# Patient Record
Sex: MALE | Race: WHITE | NOT HISPANIC OR LATINO | Employment: OTHER | URBAN - METROPOLITAN AREA
[De-identification: names, ages, dates, MRNs, and addresses within clinical notes are randomized per-mention and may not be internally consistent; named-entity substitution may affect disease eponyms.]

---

## 2021-06-21 ENCOUNTER — HOSPITAL ENCOUNTER (EMERGENCY)
Facility: HOSPITAL | Age: 21
Discharge: LEFT BEFORE DISCHARGE | End: 2021-06-21

## 2021-06-21 VITALS
SYSTOLIC BLOOD PRESSURE: 130 MMHG | TEMPERATURE: 99.3 F | RESPIRATION RATE: 18 BRPM | DIASTOLIC BLOOD PRESSURE: 88 MMHG | OXYGEN SATURATION: 98 % | HEART RATE: 100 BPM

## 2021-06-21 PROCEDURE — 99211 OFF/OP EST MAY X REQ PHY/QHP: CPT

## 2021-06-21 PROCEDURE — 99284 EMERGENCY DEPT VISIT MOD MDM: CPT

## 2021-06-21 NOTE — ED TRIAGE NOTES
Left Ankle pain - was seen recently for same.  Is able to ambulate.  It is swelling and painful to touch.  He has been on abx    Patient was placed in face mask during first look triage.  Patient was wearing a face mask throughout encounter.  I wore personal protective equipment throughout the encounter.  Hand hygiene was performed before and after patient encounter.

## 2021-06-21 NOTE — ED NOTES
Pt up to triage desk - asking about obtaining insurance, refusing to wear mask properly - explained we see people regardless of insurance or ability to pay.  Pt continues to ask about insurance - at one point got up from wheelchair. Noted that left foot is red and swollen - encouraged pt to stay, pt refused, and ambulated out of emergency room.           Phuong Henry, RN  06/21/21 1956

## 2021-06-22 ENCOUNTER — APPOINTMENT (OUTPATIENT)
Dept: GENERAL RADIOLOGY | Facility: HOSPITAL | Age: 21
End: 2021-06-22

## 2021-06-22 ENCOUNTER — HOSPITAL ENCOUNTER (OUTPATIENT)
Facility: HOSPITAL | Age: 21
Setting detail: OBSERVATION
Discharge: LEFT BEFORE DISCHARGE | End: 2021-06-23
Attending: EMERGENCY MEDICINE | Admitting: HOSPITALIST

## 2021-06-22 DIAGNOSIS — L03.116 CELLULITIS OF LEFT LOWER EXTREMITY: Primary | ICD-10-CM

## 2021-06-22 DIAGNOSIS — D72.829 LEUKOCYTOSIS, UNSPECIFIED TYPE: ICD-10-CM

## 2021-06-22 DIAGNOSIS — F19.11 HISTORY OF SUBSTANCE ABUSE (HCC): ICD-10-CM

## 2021-06-22 LAB
ALBUMIN SERPL-MCNC: 4 G/DL (ref 3.5–5.2)
ALBUMIN/GLOB SERPL: 1.2 G/DL
ALP SERPL-CCNC: 85 U/L (ref 39–117)
ALT SERPL W P-5'-P-CCNC: 12 U/L (ref 1–41)
AMPHET+METHAMPHET UR QL: POSITIVE
ANION GAP SERPL CALCULATED.3IONS-SCNC: 6.4 MMOL/L (ref 5–15)
ANION GAP SERPL CALCULATED.3IONS-SCNC: 8.5 MMOL/L (ref 5–15)
AST SERPL-CCNC: 14 U/L (ref 1–40)
BARBITURATES UR QL SCN: NEGATIVE
BASOPHILS # BLD AUTO: 0.05 10*3/MM3 (ref 0–0.2)
BASOPHILS # BLD AUTO: 0.06 10*3/MM3 (ref 0–0.2)
BASOPHILS NFR BLD AUTO: 0.4 % (ref 0–1.5)
BASOPHILS NFR BLD AUTO: 0.4 % (ref 0–1.5)
BENZODIAZ UR QL SCN: NEGATIVE
BILIRUB SERPL-MCNC: 0.7 MG/DL (ref 0–1.2)
BUN SERPL-MCNC: 11 MG/DL (ref 6–20)
BUN SERPL-MCNC: 11 MG/DL (ref 6–20)
BUN/CREAT SERPL: 12.5 (ref 7–25)
BUN/CREAT SERPL: 12.6 (ref 7–25)
CALCIUM SPEC-SCNC: 7.6 MG/DL (ref 8.6–10.5)
CALCIUM SPEC-SCNC: 8.8 MG/DL (ref 8.6–10.5)
CANNABINOIDS SERPL QL: NEGATIVE
CHLORIDE SERPL-SCNC: 102 MMOL/L (ref 98–107)
CHLORIDE SERPL-SCNC: 107 MMOL/L (ref 98–107)
CO2 SERPL-SCNC: 23.6 MMOL/L (ref 22–29)
CO2 SERPL-SCNC: 25.5 MMOL/L (ref 22–29)
COCAINE UR QL: NEGATIVE
CREAT SERPL-MCNC: 0.87 MG/DL (ref 0.76–1.27)
CREAT SERPL-MCNC: 0.88 MG/DL (ref 0.76–1.27)
CRP SERPL-MCNC: 12.82 MG/DL (ref 0–0.5)
D-LACTATE SERPL-SCNC: 0.7 MMOL/L (ref 0.5–2)
DEPRECATED RDW RBC AUTO: 35.9 FL (ref 37–54)
DEPRECATED RDW RBC AUTO: 38.4 FL (ref 37–54)
EOSINOPHIL # BLD AUTO: 0.06 10*3/MM3 (ref 0–0.4)
EOSINOPHIL # BLD AUTO: 0.1 10*3/MM3 (ref 0–0.4)
EOSINOPHIL NFR BLD AUTO: 0.4 % (ref 0.3–6.2)
EOSINOPHIL NFR BLD AUTO: 0.9 % (ref 0.3–6.2)
ERYTHROCYTE [DISTWIDTH] IN BLOOD BY AUTOMATED COUNT: 11.8 % (ref 12.3–15.4)
ERYTHROCYTE [DISTWIDTH] IN BLOOD BY AUTOMATED COUNT: 12.3 % (ref 12.3–15.4)
ERYTHROCYTE [SEDIMENTATION RATE] IN BLOOD: 33 MM/HR (ref 0–15)
ETHANOL BLD-MCNC: <10 MG/DL (ref 0–10)
ETHANOL UR QL: <0.01 %
GFR SERPL CREATININE-BSD FRML MDRD: 109 ML/MIN/1.73
GFR SERPL CREATININE-BSD FRML MDRD: 111 ML/MIN/1.73
GLOBULIN UR ELPH-MCNC: 3.3 GM/DL
GLUCOSE SERPL-MCNC: 84 MG/DL (ref 65–99)
GLUCOSE SERPL-MCNC: 95 MG/DL (ref 65–99)
HCT VFR BLD AUTO: 35.5 % (ref 37.5–51)
HCT VFR BLD AUTO: 38.9 % (ref 37.5–51)
HGB BLD-MCNC: 12.1 G/DL (ref 13–17.7)
HGB BLD-MCNC: 13.7 G/DL (ref 13–17.7)
IMM GRANULOCYTES # BLD AUTO: 0.04 10*3/MM3 (ref 0–0.05)
IMM GRANULOCYTES # BLD AUTO: 0.09 10*3/MM3 (ref 0–0.05)
IMM GRANULOCYTES NFR BLD AUTO: 0.4 % (ref 0–0.5)
IMM GRANULOCYTES NFR BLD AUTO: 0.6 % (ref 0–0.5)
INR PPP: 1.37 (ref 0.9–1.1)
LYMPHOCYTES # BLD AUTO: 3.33 10*3/MM3 (ref 0.7–3.1)
LYMPHOCYTES # BLD AUTO: 3.35 10*3/MM3 (ref 0.7–3.1)
LYMPHOCYTES NFR BLD AUTO: 21.2 % (ref 19.6–45.3)
LYMPHOCYTES NFR BLD AUTO: 29.4 % (ref 19.6–45.3)
MCH RBC QN AUTO: 29.2 PG (ref 26.6–33)
MCH RBC QN AUTO: 29.5 PG (ref 26.6–33)
MCHC RBC AUTO-ENTMCNC: 34.1 G/DL (ref 31.5–35.7)
MCHC RBC AUTO-ENTMCNC: 35.2 G/DL (ref 31.5–35.7)
MCV RBC AUTO: 83.8 FL (ref 79–97)
MCV RBC AUTO: 85.5 FL (ref 79–97)
METHADONE UR QL SCN: NEGATIVE
MONOCYTES # BLD AUTO: 1.33 10*3/MM3 (ref 0.1–0.9)
MONOCYTES # BLD AUTO: 1.62 10*3/MM3 (ref 0.1–0.9)
MONOCYTES NFR BLD AUTO: 10.3 % (ref 5–12)
MONOCYTES NFR BLD AUTO: 11.7 % (ref 5–12)
MRSA DNA SPEC QL NAA+PROBE: NORMAL
NEUTROPHILS NFR BLD AUTO: 10.52 10*3/MM3 (ref 1.7–7)
NEUTROPHILS NFR BLD AUTO: 57.2 % (ref 42.7–76)
NEUTROPHILS NFR BLD AUTO: 6.51 10*3/MM3 (ref 1.7–7)
NEUTROPHILS NFR BLD AUTO: 67.1 % (ref 42.7–76)
NRBC BLD AUTO-RTO: 0 /100 WBC (ref 0–0.2)
NRBC BLD AUTO-RTO: 0 /100 WBC (ref 0–0.2)
OPIATES UR QL: NEGATIVE
OXYCODONE UR QL SCN: NEGATIVE
PLATELET # BLD AUTO: 224 10*3/MM3 (ref 140–450)
PLATELET # BLD AUTO: 277 10*3/MM3 (ref 140–450)
PMV BLD AUTO: 9.2 FL (ref 6–12)
PMV BLD AUTO: 9.5 FL (ref 6–12)
POTASSIUM SERPL-SCNC: 3.7 MMOL/L (ref 3.5–5.2)
POTASSIUM SERPL-SCNC: 3.7 MMOL/L (ref 3.5–5.2)
PROCALCITONIN SERPL-MCNC: 0.11 NG/ML (ref 0–0.25)
PROT SERPL-MCNC: 7.3 G/DL (ref 6–8.5)
PROTHROMBIN TIME: 16.7 SECONDS (ref 11.7–14.2)
RBC # BLD AUTO: 4.15 10*6/MM3 (ref 4.14–5.8)
RBC # BLD AUTO: 4.64 10*6/MM3 (ref 4.14–5.8)
SARS-COV-2 ORF1AB RESP QL NAA+PROBE: NOT DETECTED
SODIUM SERPL-SCNC: 136 MMOL/L (ref 136–145)
SODIUM SERPL-SCNC: 137 MMOL/L (ref 136–145)
WBC # BLD AUTO: 11.38 10*3/MM3 (ref 3.4–10.8)
WBC # BLD AUTO: 15.68 10*3/MM3 (ref 3.4–10.8)

## 2021-06-22 PROCEDURE — 80307 DRUG TEST PRSMV CHEM ANLYZR: CPT | Performed by: PHYSICIAN ASSISTANT

## 2021-06-22 PROCEDURE — 82077 ASSAY SPEC XCP UR&BREATH IA: CPT | Performed by: PHYSICIAN ASSISTANT

## 2021-06-22 PROCEDURE — 87040 BLOOD CULTURE FOR BACTERIA: CPT | Performed by: PHYSICIAN ASSISTANT

## 2021-06-22 PROCEDURE — 85652 RBC SED RATE AUTOMATED: CPT | Performed by: PHYSICIAN ASSISTANT

## 2021-06-22 PROCEDURE — 73610 X-RAY EXAM OF ANKLE: CPT

## 2021-06-22 PROCEDURE — 25010000002 VANCOMYCIN 10 G RECONSTITUTED SOLUTION: Performed by: PHYSICIAN ASSISTANT

## 2021-06-22 PROCEDURE — 96365 THER/PROPH/DIAG IV INF INIT: CPT

## 2021-06-22 PROCEDURE — G0378 HOSPITAL OBSERVATION PER HR: HCPCS

## 2021-06-22 PROCEDURE — 83605 ASSAY OF LACTIC ACID: CPT | Performed by: PHYSICIAN ASSISTANT

## 2021-06-22 PROCEDURE — 25010000002 PIPERACILLIN SOD-TAZOBACTAM PER 1 G: Performed by: HOSPITALIST

## 2021-06-22 PROCEDURE — 96366 THER/PROPH/DIAG IV INF ADDON: CPT

## 2021-06-22 PROCEDURE — 25010000002 PIPERACILLIN SOD-TAZOBACTAM PER 1 G: Performed by: PHYSICIAN ASSISTANT

## 2021-06-22 PROCEDURE — 96367 TX/PROPH/DG ADDL SEQ IV INF: CPT

## 2021-06-22 PROCEDURE — 80048 BASIC METABOLIC PNL TOTAL CA: CPT | Performed by: NURSE PRACTITIONER

## 2021-06-22 PROCEDURE — 84145 PROCALCITONIN (PCT): CPT | Performed by: PHYSICIAN ASSISTANT

## 2021-06-22 PROCEDURE — 80053 COMPREHEN METABOLIC PANEL: CPT | Performed by: PHYSICIAN ASSISTANT

## 2021-06-22 PROCEDURE — 36415 COLL VENOUS BLD VENIPUNCTURE: CPT

## 2021-06-22 PROCEDURE — 96361 HYDRATE IV INFUSION ADD-ON: CPT

## 2021-06-22 PROCEDURE — 85610 PROTHROMBIN TIME: CPT | Performed by: NURSE PRACTITIONER

## 2021-06-22 PROCEDURE — 96375 TX/PRO/DX INJ NEW DRUG ADDON: CPT

## 2021-06-22 PROCEDURE — 86140 C-REACTIVE PROTEIN: CPT | Performed by: PHYSICIAN ASSISTANT

## 2021-06-22 PROCEDURE — 85025 COMPLETE CBC W/AUTO DIFF WBC: CPT | Performed by: PHYSICIAN ASSISTANT

## 2021-06-22 PROCEDURE — U0004 COV-19 TEST NON-CDC HGH THRU: HCPCS | Performed by: PHYSICIAN ASSISTANT

## 2021-06-22 PROCEDURE — 85025 COMPLETE CBC W/AUTO DIFF WBC: CPT | Performed by: NURSE PRACTITIONER

## 2021-06-22 PROCEDURE — 25010000002 VANCOMYCIN PER 500 MG: Performed by: HOSPITALIST

## 2021-06-22 PROCEDURE — 87641 MR-STAPH DNA AMP PROBE: CPT | Performed by: HOSPITALIST

## 2021-06-22 PROCEDURE — 25010000002 KETOROLAC TROMETHAMINE PER 15 MG: Performed by: PHYSICIAN ASSISTANT

## 2021-06-22 RX ORDER — SODIUM CHLORIDE 0.9 % (FLUSH) 0.9 %
10 SYRINGE (ML) INJECTION AS NEEDED
Status: DISCONTINUED | OUTPATIENT
Start: 2021-06-22 | End: 2021-06-23 | Stop reason: HOSPADM

## 2021-06-22 RX ORDER — SODIUM CHLORIDE 9 MG/ML
1000 INJECTION, SOLUTION INTRAVENOUS CONTINUOUS
Status: DISCONTINUED | OUTPATIENT
Start: 2021-06-22 | End: 2021-06-22

## 2021-06-22 RX ORDER — ACETAMINOPHEN 160 MG/5ML
650 SOLUTION ORAL EVERY 4 HOURS PRN
Status: DISCONTINUED | OUTPATIENT
Start: 2021-06-22 | End: 2021-06-23 | Stop reason: HOSPADM

## 2021-06-22 RX ORDER — HYDROCODONE BITARTRATE AND ACETAMINOPHEN 5; 325 MG/1; MG/1
1 TABLET ORAL EVERY 6 HOURS PRN
Status: DISCONTINUED | OUTPATIENT
Start: 2021-06-22 | End: 2021-06-23 | Stop reason: HOSPADM

## 2021-06-22 RX ORDER — ONDANSETRON 2 MG/ML
4 INJECTION INTRAMUSCULAR; INTRAVENOUS EVERY 6 HOURS PRN
Status: DISCONTINUED | OUTPATIENT
Start: 2021-06-22 | End: 2021-06-23 | Stop reason: HOSPADM

## 2021-06-22 RX ORDER — KETOROLAC TROMETHAMINE 30 MG/ML
30 INJECTION, SOLUTION INTRAMUSCULAR; INTRAVENOUS EVERY 6 HOURS PRN
Status: DISCONTINUED | OUTPATIENT
Start: 2021-06-22 | End: 2021-06-23 | Stop reason: HOSPADM

## 2021-06-22 RX ORDER — SODIUM CHLORIDE 9 MG/ML
100 INJECTION, SOLUTION INTRAVENOUS CONTINUOUS
Status: DISCONTINUED | OUTPATIENT
Start: 2021-06-22 | End: 2021-06-23 | Stop reason: HOSPADM

## 2021-06-22 RX ORDER — ACETAMINOPHEN 325 MG/1
650 TABLET ORAL EVERY 4 HOURS PRN
Status: DISCONTINUED | OUTPATIENT
Start: 2021-06-22 | End: 2021-06-23 | Stop reason: HOSPADM

## 2021-06-22 RX ORDER — KETOROLAC TROMETHAMINE 30 MG/ML
30 INJECTION, SOLUTION INTRAMUSCULAR; INTRAVENOUS ONCE
Status: COMPLETED | OUTPATIENT
Start: 2021-06-22 | End: 2021-06-22

## 2021-06-22 RX ORDER — VANCOMYCIN HYDROCHLORIDE 1 G/200ML
15 INJECTION, SOLUTION INTRAVENOUS EVERY 8 HOURS
Status: DISCONTINUED | OUTPATIENT
Start: 2021-06-22 | End: 2021-06-23 | Stop reason: HOSPADM

## 2021-06-22 RX ORDER — SODIUM CHLORIDE 0.9 % (FLUSH) 0.9 %
10 SYRINGE (ML) INJECTION EVERY 12 HOURS SCHEDULED
Status: DISCONTINUED | OUTPATIENT
Start: 2021-06-22 | End: 2021-06-23 | Stop reason: HOSPADM

## 2021-06-22 RX ORDER — ACETAMINOPHEN 650 MG/1
650 SUPPOSITORY RECTAL EVERY 4 HOURS PRN
Status: DISCONTINUED | OUTPATIENT
Start: 2021-06-22 | End: 2021-06-23 | Stop reason: HOSPADM

## 2021-06-22 RX ADMIN — SODIUM CHLORIDE, PRESERVATIVE FREE 10 ML: 5 INJECTION INTRAVENOUS at 11:50

## 2021-06-22 RX ADMIN — SODIUM CHLORIDE 1000 ML: 9 INJECTION, SOLUTION INTRAVENOUS at 04:45

## 2021-06-22 RX ADMIN — KETOROLAC TROMETHAMINE 30 MG: 30 INJECTION, SOLUTION INTRAMUSCULAR at 03:23

## 2021-06-22 RX ADMIN — SODIUM CHLORIDE 100 ML/HR: 9 INJECTION, SOLUTION INTRAVENOUS at 17:34

## 2021-06-22 RX ADMIN — TAZOBACTAM SODIUM AND PIPERACILLIN SODIUM 3.38 G: 375; 3 INJECTION, SOLUTION INTRAVENOUS at 03:23

## 2021-06-22 RX ADMIN — SODIUM CHLORIDE 1000 ML: 9 INJECTION, SOLUTION INTRAVENOUS at 03:22

## 2021-06-22 RX ADMIN — TAZOBACTAM SODIUM AND PIPERACILLIN SODIUM 3.38 G: 375; 3 INJECTION, SOLUTION INTRAVENOUS at 18:35

## 2021-06-22 RX ADMIN — VANCOMYCIN HYDROCHLORIDE 1500 MG: 10 INJECTION, POWDER, LYOPHILIZED, FOR SOLUTION INTRAVENOUS at 03:42

## 2021-06-22 RX ADMIN — VANCOMYCIN HYDROCHLORIDE 1000 MG: 1 INJECTION, SOLUTION INTRAVENOUS at 18:41

## 2021-06-22 NOTE — H&P
HISTORY AND PHYSICAL   [facility]        Patient Identification:  Name: Get Coronado  Age: 21 y.o.  Sex: male  :  2000  MRN: [id number]                     Primary Care Physician: Provider, No Known    Chief Complaint:  Left foot and leg pain    History of Present Illness:        The patient  is a 21 y.o. male who presents to the hospital complaining of left lower extremity pain and swelling that has been ongoing for the past 3 to 4 days.  Patient states it started at the back of the ankle and now has spread to encompass the foot, ankle, and extends of the lower third of the leg.  He states it is red and warm to the touch.  He denies fever but endorses chills.  He denies recent injury.     Reviewing the patient's chart he apparently has a past medical history of substance abuse, asthma.        The patient was evaluated in the ER and appeared to have significant cellulitis in the left lower extremity.  He was started on broad-spectrum IV antibiotics and admitted for further evaluation treatment of this.    Past Medical History:  History reviewed. No pertinent past medical history.  Past Surgical History:  History reviewed. No pertinent surgical history.   Home Meds:  No medications prior to admission.     Current meds    Current Facility-Administered Medications:   •  acetaminophen (TYLENOL) tablet 650 mg, 650 mg, Oral, Q4H PRN **OR** acetaminophen (TYLENOL) 160 MG/5ML solution 650 mg, 650 mg, Oral, Q4H PRN **OR** acetaminophen (TYLENOL) suppository 650 mg, 650 mg, Rectal, Q4H PRN, Gris Noriega, APRN  •  ketorolac (TORADOL) injection 30 mg, 30 mg, Intravenous, Q6H PRN, Gris Noriega APRN  •  ondansetron (ZOFRAN) injection 4 mg, 4 mg, Intravenous, Q6H PRN, Gris Noriega APRN  •  [COMPLETED] Insert peripheral IV, , , Once **AND** sodium chloride 0.9 % flush 10 mL, 10 mL, Intravenous, PRN, Kory Brown III, PA  •  sodium chloride 0.9 % flush 10 mL, 10 mL,  Intravenous, Q12H, Gris Noriega APRN, 10 mL at 21 1150  •  sodium chloride 0.9 % flush 10 mL, 10 mL, Intravenous, PRN, Gris Noriega APRN  Allergies:  No Known Allergies  Immunizations:    There is no immunization history on file for this patient.  Social History:   Social History     Social History Narrative   • Not on file     Social History     Socioeconomic History   • Marital status: Single     Spouse name: Not on file   • Number of children: Not on file   • Years of education: Not on file   • Highest education level: Not on file   Tobacco Use   • Smoking status: Current Every Day Smoker   • Smokeless tobacco: Never Used   Substance and Sexual Activity   • Alcohol use: Not Currently   • Drug use: Never   • Sexual activity: Defer       Family History:  History reviewed. No pertinent family history.     Review of Systems  See history of present illness and past medical history.  Patient denies headache, dizziness, syncope, falls, trauma, change in vision, change in hearing, change in taste, changes in weight, changes in appetite, focal weakness, numbness, or paresthesia.  Patient denies chest pain, palpitations, dyspnea, orthopnea, PND, cough, sinus pressure, rhinorrhea, epistaxis, hemoptysis, nausea, vomiting, hematemesis, diarrhea, constipation or hematchezia.  Denies cold or heat intolerance, polydipsia, polyuria, polyphagia. Denies hematuria, pyuria, dysuria, hesitancy, frequency or urgency.   Denies fever, chills, sweats, night sweats.  Denies missing any routine medications. Remainder of ROS is negative.    Objective:  tMax 24 hrs: Temp (24hrs), Av.6 °F (36.4 °C), Min:96.4 °F (35.8 °C), Max:98.7 °F (37.1 °C)    Vitals Ranges:   Temp:  [96.4 °F (35.8 °C)-98.7 °F (37.1 °C)] 96.6 °F (35.9 °C)  Heart Rate:  [] 52  Resp:  [14-18] 16  BP: ()/(49-72) 105/72      Exam:  /72 (BP Location: Left arm, Patient Position: Lying)   Pulse 52   Temp 96.6 °F (35.9 °C) (Oral)    "Resp 16   Ht 190.5 cm (75\")   Wt 74.8 kg (165 lb)   SpO2 100%   BMI 20.62 kg/m²     General Appearance:    Alert, cooperative, no distress, appears stated age   Head:    Normocephalic, without obvious abnormality, atraumatic   Eyes:    PERRL, conjunctivae/corneas clear, EOM's intact, both eyes   Ears:    Normal external ear canals, both ears   Nose:   Nares normal, septum midline, mucosa normal, no drainage    or sinus tenderness   Throat:   Lips, mucosa, and tongue normal   Neck:   Supple, symmetrical, trachea midline, no adenopathy;     thyroid:  no enlargement/tenderness/nodules; no carotid    bruit or JVD   Back:     Symmetric, no curvature, ROM normal, no CVA tenderness   Lungs:     Clear to auscultation bilaterally, respirations unlabored   Chest Wall:    No tenderness or deformity    Heart:    Regular rate and rhythm, S1 and S2 normal, no murmur, rub   or gallop   Abdomen:     Soft, nontender, bowel sounds active all four quadrants,     no masses, no hepatomegaly, no splenomegaly   Extremities:   Extremities normal, atraumatic, no cyanosis or edema   Pulses:   2+ and symmetric all extremities   Skin:   Skin color, texture, turgor normal, cellulitis left leg and foot   Lymph nodes:   Cervical, supraclavicular, and axillary nodes normal   Neurologic:   CNII-XII intact, normal strength, sensation intact throughout      .    Data Review:  Lab Results (last 72 hours)     Procedure Component Value Units Date/Time    Urine Drug Screen - Urine, Clean Catch [[id number]]  (Abnormal) Collected: 06/22/21 1159    Specimen: Urine, Clean Catch Updated: 06/22/21 1300     Amphet/Methamphet, Screen Positive     Barbiturates Screen, Urine Negative     Benzodiazepine Screen, Urine Negative     Cocaine Screen, Urine Negative     Opiate Screen Negative     THC, Screen, Urine Negative     Methadone Screen, Urine Negative     Oxycodone Screen, Urine Negative    Narrative:      Negative Thresholds Per Drugs " Screened:    Amphetamines                 500 ng/ml  Barbiturates                 200 ng/ml  Benzodiazepines              100 ng/ml  Cocaine                      300 ng/ml  Methadone                    300 ng/ml  Opiates                      300 ng/ml  Oxycodone                    100 ng/ml  THC                           50 ng/ml    The Normal Value for all drugs tested is negative. This report includes final unconfirmed screening results to be used for medical treatment purposes only. Unconfirmed results must not be used for non-medical purposes such as employment or legal testing. Clinical consideration should be applied to any drug of abuse test, particularly when unconfirmed results are used.            COVID PRE-OP / PRE-PROCEDURE SCREENING ORDER (NO ISOLATION) - Swab, Nasopharynx [[id number]]  (Normal) Collected: 06/22/21 0230    Specimen: Swab from Nasopharynx Updated: 06/22/21 1140    Narrative:      The following orders were created for panel order COVID PRE-OP / PRE-PROCEDURE SCREENING ORDER (NO ISOLATION) - Swab, Nasopharynx.  Procedure                               Abnormality         Status                     ---------                               -----------         ------                     COVID-19,APTIMA PANTHER,...[[id number]]  Normal              Final result                 Please view results for these tests on the individual orders.    COVID-19,APTIMA PANTHER,[facility], NP/OP SWAB IN UTM/VTM/SALINE TRANSPORT MEDIA,24 HR TAT - Swab, Nasopharynx [[id number]]  (Normal) Collected: 06/22/21 0230    Specimen: Swab from Nasopharynx Updated: 06/22/21 1140     COVID19 Not Detected    Narrative:      Fact sheet for providers: https://www.fda.gov/media/928688/download     Fact sheet for patients: https://www.fda.gov/media/486046/download    Test performed by RT PCR.    Protime-INR [[id number]]  (Abnormal) Collected: 06/22/21 0551    Specimen: Blood Updated: 06/22/21 0626     Protime 16.7 Seconds       INR 1.37    Basic Metabolic Panel [[id number]]  (Abnormal) Collected: 06/22/21 0551    Specimen: Blood Updated: 06/22/21 0623     Glucose 84 mg/dL      BUN 11 mg/dL      Creatinine 0.88 mg/dL      Sodium 137 mmol/L      Potassium 3.7 mmol/L      Chloride 107 mmol/L      CO2 23.6 mmol/L      Calcium 7.6 mg/dL      eGFR Non African Amer 109 mL/min/1.73      BUN/Creatinine Ratio 12.5     Anion Gap 6.4 mmol/L     Narrative:      GFR Normal >60  Chronic Kidney Disease <60  Kidney Failure <15      CBC Auto Differential [[id number]]  (Abnormal) Collected: 06/22/21 0551    Specimen: Blood Updated: 06/22/21 0607     WBC 11.38 10*3/mm3      RBC 4.15 10*6/mm3      Hemoglobin 12.1 g/dL      Hematocrit 35.5 %      MCV 85.5 fL      MCH 29.2 pg      MCHC 34.1 g/dL      RDW 12.3 %      RDW-SD 38.4 fl      MPV 9.5 fL      Platelets 224 10*3/mm3      Neutrophil % 57.2 %      Lymphocyte % 29.4 %      Monocyte % 11.7 %      Eosinophil % 0.9 %      Basophil % 0.4 %      Immature Grans % 0.4 %      Neutrophils, Absolute 6.51 10*3/mm3      Lymphocytes, Absolute 3.35 10*3/mm3      Monocytes, Absolute 1.33 10*3/mm3      Eosinophils, Absolute 0.10 10*3/mm3      Basophils, Absolute 0.05 10*3/mm3      Immature Grans, Absolute 0.04 10*3/mm3      nRBC 0.0 /100 WBC     Ethanol [[id number]] Collected: 06/22/21 0359    Specimen: Blood Updated: 06/22/21 0436     Ethanol <10 mg/dL      Ethanol % <0.010 %     Sedimentation Rate [[id number]]  (Abnormal) Collected: 06/22/21 0225    Specimen: Blood Updated: 06/22/21 0308     Sed Rate 33 mm/hr     Procalcitonin [[id number]]  (Normal) Collected: 06/22/21 0225    Specimen: Blood Updated: 06/22/21 0303     Procalcitonin 0.11 ng/mL     Narrative:      As a Marker for Sepsis (Non-Neonates):     1. <0.5 ng/mL represents a low risk of severe sepsis and/or septic shock.  2. >2 ng/mL represents a high risk of severe sepsis and/or septic shock.    As a Marker for Lower Respiratory Tract Infections that require  "antibiotic therapy:  PCT on Admission     Antibiotic Therapy             6-12 Hrs later  >0.5                          Strongly Recommended            >0.25 - <0.5             Recommended  0.1 - 0.25                  Discouraged                       Remeasure/reassess PCT  <0.1                         Strongly Discouraged         Remeasure/reassess PCT      As 28 day mortality risk marker: \"Change in Procalcitonin Result\" (>80% or <=80%) if Day 0 (or Day 1) and Day 4 values are available. Refer to http://www.[facility]cal[facility]/    Change in PCT <=80 %   A decrease of PCT levels below or equal to 80% defines a positive change in PCT test result representing a higher risk for 28-day all-cause mortality of patients diagnosed with severe sepsis or septic shock.    Change in PCT >80 %   A decrease of PCT levels of more than 80% defines a negative change in PCT result representing a lower risk for 28-day all-cause mortality of patients diagnosed with severe sepsis or septic shock.              Results may be falsely decreased if patient taking Biotin.     Comprehensive Metabolic Panel [[id number]] Collected: 06/22/21 0225    Specimen: Blood Updated: 06/22/21 0257     Glucose 95 mg/dL      BUN 11 mg/dL      Creatinine 0.87 mg/dL      Sodium 136 mmol/L      Potassium 3.7 mmol/L      Chloride 102 mmol/L      CO2 25.5 mmol/L      Calcium 8.8 mg/dL      Total Protein 7.3 g/dL      Albumin 4.00 g/dL      ALT (SGPT) 12 U/L      AST (SGOT) 14 U/L      Alkaline Phosphatase 85 U/L      Total Bilirubin 0.7 mg/dL      eGFR Non African Amer 111 mL/min/1.73      Globulin 3.3 gm/dL      A/G Ratio 1.2 g/dL      BUN/Creatinine Ratio 12.6     Anion Gap 8.5 mmol/L     Narrative:      GFR Normal >60  Chronic Kidney Disease <60  Kidney Failure <15      C-reactive Protein [[id number]]  (Abnormal) Collected: 06/22/21 0225    Specimen: Blood Updated: 06/22/21 0257     C-Reactive Protein 12.82 mg/dL     Lactic Acid, Plasma [[id number]]  " (Normal) Collected: 06/22/21 0225    Specimen: Blood Updated: 06/22/21 0254     Lactate 0.7 mmol/L     Blood Culture - Blood, Arm, Right [[id number]] Collected: 06/22/21 0226    Specimen: Blood from Arm, Right Updated: 06/22/21 0241    Blood Culture - Blood, Arm, Right [[id number]] Collected: 06/22/21 0225    Specimen: Blood from Arm, Right Updated: 06/22/21 0240    CBC & Differential [[id number]]  (Abnormal) Collected: 06/22/21 0225    Specimen: Blood Updated: 06/22/21 0240    Narrative:      The following orders were created for panel order CBC & Differential.  Procedure                               Abnormality         Status                     ---------                               -----------         ------                     CBC Auto Differential[[id number]]        Abnormal            Final result                 Please view results for these tests on the individual orders.    CBC Auto Differential [[id number]]  (Abnormal) Collected: 06/22/21 0225    Specimen: Blood Updated: 06/22/21 0240     WBC 15.68 10*3/mm3      RBC 4.64 10*6/mm3      Hemoglobin 13.7 g/dL      Hematocrit 38.9 %      MCV 83.8 fL      MCH 29.5 pg      MCHC 35.2 g/dL      RDW 11.8 %      RDW-SD 35.9 fl      MPV 9.2 fL      Platelets 277 10*3/mm3      Neutrophil % 67.1 %      Lymphocyte % 21.2 %      Monocyte % 10.3 %      Eosinophil % 0.4 %      Basophil % 0.4 %      Immature Grans % 0.6 %      Neutrophils, Absolute 10.52 10*3/mm3      Lymphocytes, Absolute 3.33 10*3/mm3      Monocytes, Absolute 1.62 10*3/mm3      Eosinophils, Absolute 0.06 10*3/mm3      Basophils, Absolute 0.06 10*3/mm3      Immature Grans, Absolute 0.09 10*3/mm3      nRBC 0.0 /100 WBC                    Imaging Results (All)     Procedure Component Value Units Date/Time    XR Ankle 3+ View Left [[id number]] Collected: 06/22/21 0352     Updated: 06/22/21 0352    Narrative:        Patient: ALESSANDRO HARO  Time Out: 03:51  Exam(s): FILM LEFT ANKLE     EXAM:    XR Left Ankle  Complete, 3 or More Views    CLINICAL HISTORY:     Reason for exam: ankle pain.    TECHNIQUE:    Frontal, lateral and oblique views of the left ankle.    COMPARISON:    No relevant prior studies available.    FINDINGS:    Lateral soft tissue swelling.  No acute fracture or malalignment.  No   focal osseous abnormality.  Ankle mortise is congruent.    IMPRESSION:       Lateral soft tissue swelling.  No acute fracture.      Impression:          Electronically signed by Alfred Sawyer M.D. on 06-22-21 at 0351        History reviewed. No pertinent past medical history.    Assessment:  Active Hospital Problems    Diagnosis  POA   • **Cellulitis of left lower extremity [L03.116]  Yes   • History of substance abuse (CMS/[facility]) [F19.11]  Unknown   • Leukocytosis [D72.829]  Unknown      Resolved Hospital Problems   No resolved problems to display.       Plan:      The patient is admitted to the hospital and will continue with broad-spectrum IV antibiotics.  Await results of cultures.  Will get follow-up lab studies.    Alon Montez MD  6/22/2021  16:16 EDT

## 2021-06-22 NOTE — ED NOTES
Nursing report ED to floor  [facility]  21 y.o.  male    HPI (triage note):   Chief Complaint   Patient presents with   • Leg Swelling       Admitting doctor:   Alon Montez MD    Admitting diagnosis:   The primary encounter diagnosis was Cellulitis of left lower extremity. Diagnoses of Leukocytosis, unspecified type and History of substance abuse (CMS/HCC) were also pertinent to this visit.    Code status:   Current Code Status     Date Active Code Status Order ID Comments User Context       6/22/2021 0434 CPR [id number]  Gris Noriega, [facility] ED     Advance Care Planning Activity      Questions for Current Code Status     Question Answer Comment    Code Status CPR     Medical Interventions (Level of Support Prior to Arrest) Full           Allergies:   Patient has no known allergies.    Weight:       06/21/21  2345   Weight: 74.8 kg (165 lb)       Most recent vitals:   Vitals:    06/22/21 0430 06/22/21 0500 06/22/21 0530 06/22/21 0600   BP: 107/61 96/63 100/55 102/70   Pulse: 59 65  65   Resp:  18     Temp:       TempSrc:       SpO2: 96% 99% 100% 98%   Weight:       Height:           Active LDAs/IV Access:   Lines, Drains & Airways    Active LDAs     Name:   Placement date:   Placement time:   Site:   Days:    Peripheral IV 06/22/21 0220 Right Antecubital   06/22/21 0220    Antecubital   less than 1                Labs (abnormal labs have a star):   Labs Reviewed   SEDIMENTATION RATE - Abnormal; Notable for the following components:       Result Value    Sed Rate 33 (*)     All other components within normal limits   C-REACTIVE PROTEIN - Abnormal; Notable for the following components:    C-Reactive Protein 12.82 (*)     All other components within normal limits   CBC WITH AUTO DIFFERENTIAL - Abnormal; Notable for the following components:    WBC 15.68 (*)     RDW 11.8 (*)     RDW-SD 35.9 (*)     Immature Grans % 0.6 (*)     Neutrophils, Absolute 10.52 (*)     Lymphocytes, Absolute 3.33 (*)      "Monocytes, Absolute 1.62 (*)     Immature Grans, Absolute 0.09 (*)     All other components within normal limits   BASIC METABOLIC PANEL - Abnormal; Notable for the following components:    Calcium 7.6 (*)     All other components within normal limits    Narrative:     GFR Normal >60  Chronic Kidney Disease <60  Kidney Failure <15     CBC WITH AUTO DIFFERENTIAL - Abnormal; Notable for the following components:    WBC 11.38 (*)     Hemoglobin 12.1 (*)     Hematocrit 35.5 (*)     Lymphocytes, Absolute 3.35 (*)     Monocytes, Absolute 1.33 (*)     All other components within normal limits   PROTIME-INR - Abnormal; Notable for the following components:    Protime 16.7 (*)     INR 1.37 (*)     All other components within normal limits   LACTIC ACID, PLASMA - Normal   PROCALCITONIN - Normal    Narrative:     As a Marker for Sepsis (Non-Neonates):     1. <0.5 ng/mL represents a low risk of severe sepsis and/or septic shock.  2. >2 ng/mL represents a high risk of severe sepsis and/or septic shock.    As a Marker for Lower Respiratory Tract Infections that require antibiotic therapy:  PCT on Admission     Antibiotic Therapy             6-12 Hrs later  >0.5                          Strongly Recommended            >0.25 - <0.5             Recommended  0.1 - 0.25                  Discouraged                       Remeasure/reassess PCT  <0.1                         Strongly Discouraged         Remeasure/reassess PCT      As 28 day mortality risk marker: \"Change in Procalcitonin Result\" (>80% or <=80%) if Day 0 (or Day 1) and Day 4 values are available. Refer to http://www.[facility]-pct-calculator.com/    Change in PCT <=80 %   A decrease of PCT levels below or equal to 80% defines a positive change in PCT test result representing a higher risk for 28-day all-cause mortality of patients diagnosed with severe sepsis or septic shock.    Change in PCT >80 %   A decrease of PCT levels of more than 80% defines a negative change in PCT " result representing a lower risk for 28-day all-cause mortality of patients diagnosed with severe sepsis or septic shock.              Results may be falsely decreased if patient taking Biotin.    BLOOD CULTURE   BLOOD CULTURE   COVID PRE-OP / PRE-PROCEDURE SCREENING ORDER (NO ISOLATION)    Narrative:     The following orders were created for panel order COVID PRE-OP / PRE-PROCEDURE SCREENING ORDER (NO ISOLATION) - Swab, Nasopharynx.  Procedure                               Abnormality         Status                     ---------                               -----------         ------                     COVID-19,APTIMA PANTHER,...[[id number]]                      In process                   Please view results for these tests on the individual orders.   COVID-19,APTIMA PANTHER,[facility],NP/OP SWAB IN UTM/VTM/SALINE TRANSPORT MEDIA,24 HR TAT   COMPREHENSIVE METABOLIC PANEL    Narrative:     GFR Normal >60  Chronic Kidney Disease <60  Kidney Failure <15     ETHANOL   URINE DRUG SCREEN   CBC AND DIFFERENTIAL    Narrative:     The following orders were created for panel order CBC & Differential.  Procedure                               Abnormality         Status                     ---------                               -----------         ------                     CBC Auto Differential[[id number]]        Abnormal            Final result                 Please view results for these tests on the individual orders.       EKG:   No orders to display       Meds given in ED:   Medications   sodium chloride 0.9 % flush 10 mL (has no administration in time range)   sodium chloride 0.9 % flush 10 mL (has no administration in time range)   sodium chloride 0.9 % flush 10 mL (has no administration in time range)   acetaminophen (TYLENOL) tablet 650 mg (has no administration in time range)     Or   acetaminophen (TYLENOL) 160 MG/5ML solution 650 mg (has no administration in time range)     Or   acetaminophen (TYLENOL)  suppository 650 mg (has no administration in time range)   ondansetron (ZOFRAN) injection 4 mg (has no administration in time range)   ketorolac (TORADOL) injection 30 mg (has no administration in time range)   vancomycin 1500 mg/500 mL 0.9% NS IVPB ([facility]) (0 mg Intravenous Stopped 6/22/21 0538)   piperacillin-tazobactam (ZOSYN) 3.375 g in iso-osmotic dextrose 50 ml (premix) (0 g Intravenous Stopped 6/22/21 0342)   ketorolac (TORADOL) injection 30 mg (30 mg Intravenous Given 6/22/21 0323)   sodium chloride 0.9 % bolus 1,000 mL (0 mL Intravenous Stopped 6/22/21 0445)   sodium chloride 0.9 % bolus 1,000 mL (0 mL Intravenous Stopped 6/22/21 0538)       Imaging results:  XR Ankle 3+ View Left    Result Date: 6/22/2021  Electronically signed by Alfred Sawyer M.D. on 06-22-21 at 0351      Ambulatory status:   - with assist      Social issues:   Social History     Socioeconomic History   • Marital status: Single     Spouse name: Not on file   • Number of children: Not on file   • Years of education: Not on file   • Highest education level: Not on file   Tobacco Use   • Smoking status: Current Every Day Smoker   • Smokeless tobacco: Never Used   Substance and Sexual Activity   • Alcohol use: Not Currently   • Drug use: Never   • Sexual activity: Defer    Nursing report ED to floor       Nancy Sheffield RN  06/22/21 3919

## 2021-06-22 NOTE — ED NOTES
Pt has refused to urinate and refuses to give a urine sample as well as refuses to allow an in and out for urine.      Nancy Sheffield RN  06/22/21 0704

## 2021-06-22 NOTE — ED TRIAGE NOTES
Pt to ER with c/o L ankle swelling. Pt recently LWBS for same. Pt L ankle appears red and swollen, pt ambulatory with difficulty in triage.      Pt masked in triage, staff in appropriate ppe.

## 2021-06-22 NOTE — ED NOTES
Resting at this time. Less confused after awakening but still very drowsy and startles when awakened. Restless when receiving care, SCDs not applied at this time due to restlessness.      Lesly Sherman, PERI  06/22/21 0516

## 2021-06-22 NOTE — CASE MANAGEMENT/SOCIAL WORK
Discharge Planning Assessment  [facility]     Patient Name: Get Coronado  MRN: [id number]  Today's Date: 6/22/2021    Admit Date: 6/22/2021    Discharge Needs Assessment     Row Name 06/22/21 9087       Living Environment    Lives With  alone    Current Living Arrangements  homeless    Primary Care Provided by  self    Provides Primary Care For  no one, unable/limited ability to care for self    Family Caregiver if Needed  none    Quality of Family Relationships  involved    Able to Return to Prior Arrangements  no       Resource/Environmental Concerns    Resource/Environmental Concerns  none    Transportation Concerns  car, none       Transition Planning    Patient/Family Anticipated Services at Transition  none    Transportation Anticipated  other (see comments) LYFT or cab       Discharge Needs Assessment    Equipment Currently Used at Home  none    Concerns to be Addressed  homelessness    Anticipated Changes Related to Illness  none    Equipment Needed After Discharge  none    Current Discharge Risk  physical impairment;homeless        Discharge Plan     Row Name 06/22/21 8580       Plan    Plan  Plans home; denies needs.    Provided Post Acute Provider List?  N/A    N/A Provider List Comment  The patient denies needs upon d/c.    Provided Post Acute Provider Quality & Resource List?  N/A    N/A Quality & Resource List Comment  The patient denies needs upon d/c.    Patient/Family in Agreement with Plan  yes    Plan Comments  Met with the patient at bedside; explained role of CCP, verified facesheet and discussed discharge planning needs.  The patient provided CCP with permission to speak to his mother Laury Coronado [phone] and to inform her that he was a patient at [facility].  The patient states that his parents had been helping cover the cost of him staying in hotels due to him not being allowed to reside in the home of his parents.  The patient states that his parents state that they will assist him in  obtaining an apartment if he is able to get a job.  The patient states that he was hired at the [employer] [facility] [place] however he was admitted into the hospital.  The patient states that he was on his parents insurance.  The patient was not sure where he would go upon d/c as he refused to go to a homeless shelter and states that he has been to [facility] previously.  The patient also refused any offers for referrals to obtain substance use disorder treatment.  Spoke to the patient’s mother who confirmed all the information the patient provided to [facility].  She states that she will email CCP a copy of her insurance card that as the patient is covered under her plan and she states that she will consider paying for another hotel for the patient upon d/c.  CCP will follow for the patient’s needs and will reach out to his mother to see if they will cover a hotel for the patient.  If so, will obtain the address and will utilize LYFT or a cab to get the patient to that location upon d/c.  If not, the patient will need to d/c to a homeless shelter or provide CCP with another address for him to go to upon d/c.  CCP will follow for patient’s d/c needs, will follow up with his mother regarding hotel location or if the patient will need to d/c to another address or homeless shelter and will assist with transportation for the patient upon d/c. ANJELICA Green        Continued Care and Services - Admitted Since 6/22/2021    Coordination has not been started for this encounter.         Demographic Summary     Row Name 06/22/21 4922       General Information    Admission Type  observation    Arrived From  home    Referral Source  admission list    Reason for Consult  discharge planning    Preferred Language  English     Used During This Interaction  no       Contact Information    Permission Granted to Share Info With  family/designee        Functional Status     Row Name 06/22/21 5177       Functional Status     Usual Activity Tolerance  moderate    Current Activity Tolerance  fair       Functional Status, IADL    Medications  independent    Meal Preparation  independent    Housekeeping  independent    Laundry  independent    Shopping  independent       Mental Status    General Appearance WDL  WDL       Mental Status Summary    Recent Changes in Mental Status/Cognitive Functioning  no changes        Psychosocial    No documentation.       Abuse/Neglect    No documentation.       Legal    No documentation.       Substance Abuse    No documentation.       Patient Forms    No documentation.           ANJELICA Dimas

## 2021-06-22 NOTE — ED PROVIDER NOTES
MD ATTESTATION NOTE    The YVETTE and I have discussed this patient's history, physical exam, and treatment plan.  I have reviewed the documentation and personally had a face to face interaction with the patient. I affirm the documentation and agree with the treatment and plan.  The attached note describes my personal findings.      Get Coronado is a 21 y.o. male who presents to the ED c/o redness and swelling to left ankle.  Reports fever and chills.  Has been present for several days.  Denies chest pain abdominal pain nausea vomiting.      On exam:  No acute distress, normocephalic atraumatic, supple nontender, regular rate and rhythm, clear to auscultation bilaterally, soft nontender nondistended, moving all extremities -erythema and swelling to lateral left ankle neurovascularly intact distally    Labs  Recent Results (from the past 24 hour(s))   Comprehensive Metabolic Panel    Collection Time: 06/22/21  2:25 AM    Specimen: Blood   Result Value Ref Range    Glucose 95 65 - 99 mg/dL    BUN 11 6 - 20 mg/dL    Creatinine 0.87 0.76 - 1.27 mg/dL    Sodium 136 136 - 145 mmol/L    Potassium 3.7 3.5 - 5.2 mmol/L    Chloride 102 98 - 107 mmol/L    CO2 25.5 22.0 - 29.0 mmol/L    Calcium 8.8 8.6 - 10.5 mg/dL    Total Protein 7.3 6.0 - 8.5 g/dL    Albumin 4.00 3.50 - 5.20 g/dL    ALT (SGPT) 12 1 - 41 U/L    AST (SGOT) 14 1 - 40 U/L    Alkaline Phosphatase 85 39 - 117 U/L    Total Bilirubin 0.7 0.0 - 1.2 mg/dL    eGFR Non African Amer 111 >60 mL/min/1.73    Globulin 3.3 gm/dL    A/G Ratio 1.2 g/dL    BUN/Creatinine Ratio 12.6 7.0 - 25.0    Anion Gap 8.5 5.0 - 15.0 mmol/L   Lactic Acid, Plasma    Collection Time: 06/22/21  2:25 AM    Specimen: Blood   Result Value Ref Range    Lactate 0.7 0.5 - 2.0 mmol/L   Procalcitonin    Collection Time: 06/22/21  2:25 AM    Specimen: Blood   Result Value Ref Range    Procalcitonin 0.11 0.00 - 0.25 ng/mL   Sedimentation Rate    Collection Time: 06/22/21  2:25 AM    Specimen: Blood    Result Value Ref Range    Sed Rate 33 (H) 0 - 15 mm/hr   C-reactive Protein    Collection Time: 06/22/21  2:25 AM    Specimen: Blood   Result Value Ref Range    C-Reactive Protein 12.82 (H) 0.00 - 0.50 mg/dL   CBC Auto Differential    Collection Time: 06/22/21  2:25 AM    Specimen: Blood   Result Value Ref Range    WBC 15.68 (H) 3.40 - 10.80 10*3/mm3    RBC 4.64 4.14 - 5.80 10*6/mm3    Hemoglobin 13.7 13.0 - 17.7 g/dL    Hematocrit 38.9 37.5 - 51.0 %    MCV 83.8 79.0 - 97.0 fL    MCH 29.5 26.6 - 33.0 pg    MCHC 35.2 31.5 - 35.7 g/dL    RDW 11.8 (L) 12.3 - 15.4 %    RDW-SD 35.9 (L) 37.0 - 54.0 fl    MPV 9.2 6.0 - 12.0 fL    Platelets 277 140 - 450 10*3/mm3    Neutrophil % 67.1 42.7 - 76.0 %    Lymphocyte % 21.2 19.6 - 45.3 %    Monocyte % 10.3 5.0 - 12.0 %    Eosinophil % 0.4 0.3 - 6.2 %    Basophil % 0.4 0.0 - 1.5 %    Immature Grans % 0.6 (H) 0.0 - 0.5 %    Neutrophils, Absolute 10.52 (H) 1.70 - 7.00 10*3/mm3    Lymphocytes, Absolute 3.33 (H) 0.70 - 3.10 10*3/mm3    Monocytes, Absolute 1.62 (H) 0.10 - 0.90 10*3/mm3    Eosinophils, Absolute 0.06 0.00 - 0.40 10*3/mm3    Basophils, Absolute 0.06 0.00 - 0.20 10*3/mm3    Immature Grans, Absolute 0.09 (H) 0.00 - 0.05 10*3/mm3    nRBC 0.0 0.0 - 0.2 /100 WBC   Ethanol    Collection Time: 06/22/21  3:59 AM    Specimen: Blood   Result Value Ref Range    Ethanol <10 0 - 10 mg/dL    Ethanol % <0.010 %       Radiology  XR Ankle 3+ View Left    Result Date: 6/22/2021  Patient: ALESSANDRO HARO  Time Out: 03:51 Exam(s): FILM LEFT ANKLE EXAM:   XR Left Ankle Complete, 3 or More Views CLINICAL HISTORY:    Reason for exam: ankle pain. TECHNIQUE:   Frontal, lateral and oblique views of the left ankle. COMPARISON:   No relevant prior studies available. FINDINGS:   Lateral soft tissue swelling.  No acute fracture or malalignment.  No focal osseous abnormality.  Ankle mortise is congruent. IMPRESSION:     Lateral soft tissue swelling.  No acute fracture.     Electronically signed by  Alfred Sawyer M.D. on 06-22-21 at 0351     Medical Decision Making:  ED Course as of Jun 22 0551   Tue Jun 22, 2021   0406 WBC(!): 15.68 [RC]   0407 C-Reactive Protein(!): 12.82 [RC]   0407 Sed Rate(!): 33 [RC]   0407 RBC: 4.64 [RC]   0407 Hemoglobin: 13.7 [RC]   0407 Hematocrit: 38.9 [RC]   0407 Platelets: 277 [RC]   0407 Glucose: 95 [RC]   0407 BUN: 11 [RC]   0407 Creatinine: 0.87 [RC]   0407 Sodium: 136 [RC]   0407 Potassium: 3.7 [RC]   0407 CO2: 25.5 [RC]   0407 Anion Gap: 8.5 [RC]   0407 ALT (SGPT): 12 [RC]   0407 AST (SGOT): 14 [RC]   0407 Alkaline Phosphatase: 85 [RC]   0407 Total Bilirubin: 0.7 [RC]   0407 Lactate: 0.7 [RC]   0407 Procalcitonin: 0.11 [RC]      ED Course User Index  [RC] Kory Brown III, PA           PPE: Both the patient and I wore a surgical mask throughout the entire patient encounter. I wore protective goggles.     Diagnosis  Final diagnoses:   Cellulitis of left lower extremity   Leukocytosis, unspecified type   History of substance abuse (CMS/HCC)        Balaji Kirby MD  06/22/21 0551

## 2021-06-22 NOTE — ED PROVIDER NOTES
EMERGENCY DEPARTMENT ENCOUNTER    Room Number:  01/01  Date of encounter:  6/22/2021  PCP: Provider, No Known  Historian: Patient      HPI:  Chief Complaint: Left lower extremity pain and swelling  A complete HPI/ROS/PMH/PSH/SH/FH are unobtainable due to: Nothing    Context: Get Coronado is a 21 y.o. male who presents to the ED c/o left lower extremity pain and swelling that has been ongoing for the past 3 to 4 days.  Patient states it started at the back of the ankle and now has spread to encompass the foot, ankle, and extends of the lower third of the leg.  He states it is red and warm to the touch.  He denies fever but endorses chills.  He denies recent injury.    Reviewing the patient's chart he apparently has a past medical history of substance abuse, asthma.      PAST MEDICAL HISTORY  Active Ambulatory Problems     Diagnosis Date Noted   • No Active Ambulatory Problems     Resolved Ambulatory Problems     Diagnosis Date Noted   • No Resolved Ambulatory Problems     No Additional Past Medical History         PAST SURGICAL HISTORY  History reviewed. No pertinent surgical history.      FAMILY HISTORY  History reviewed. No pertinent family history.      SOCIAL HISTORY  Social History     Socioeconomic History   • Marital status: Single     Spouse name: Not on file   • Number of children: Not on file   • Years of education: Not on file   • Highest education level: Not on file   Tobacco Use   • Smoking status: Current Every Day Smoker   • Smokeless tobacco: Never Used   Substance and Sexual Activity   • Alcohol use: Not Currently   • Drug use: Never   • Sexual activity: Defer         ALLERGIES  Patient has no known allergies.        REVIEW OF SYSTEMS  Review of Systems   Constitutional: Negative for chills and fever.   HENT: Negative.    Eyes: Negative.    Respiratory: Negative.    Cardiovascular: Negative.    Gastrointestinal: Negative.    Genitourinary: Negative.    Musculoskeletal:        Left foot, ankle,  leg pain   Skin: Positive for rash and wound.        All systems reviewed and negative except for those discussed in HPI.       PHYSICAL EXAM    I have reviewed the triage vital signs and nursing notes.    ED Triage Vitals   Temp Heart Rate Resp BP SpO2   06/21/21 2156 06/21/21 2156 06/21/21 2156 06/21/21 2156 06/21/21 2156   98.7 °F (37.1 °C) 101 18 165/67 99 %      Temp src Heart Rate Source Patient Position BP Location FiO2 (%)   -- 06/21/21 2346 -- -- --    Monitor          Physical Exam  GENERAL: Well-nourished, appears uncomfortable, somewhat somnolent and appears under the influence  HENT: nares patent  EYES: no scleral icterus, pupils dilated  CV: regular rhythm, regular rate, DP and PT are palpable and cap refill is intact in the involved left lower extremity  RESPIRATORY: normal effort, lungs CTA B  ABDOMEN: soft, nontender  MUSCULOSKELETAL: Patient's left lower extremity compartments are soft.  There does appear to be increased pain with left ankle movement.  NEURO: alert, moves all extremities, follows commands  SKIN: Erithmatic, warm to the touch, circumferentially swollen swollen left foot, left ankle, and distal 1/4 of the patient's left lower extremity.  There are 2 abrasions on the back of the patient's left heel that could be a site/source of infection.    LAB RESULTS  Recent Results (from the past 24 hour(s))   Comprehensive Metabolic Panel    Collection Time: 06/22/21  2:25 AM    Specimen: Blood   Result Value Ref Range    Glucose 95 65 - 99 mg/dL    BUN 11 6 - 20 mg/dL    Creatinine 0.87 0.76 - 1.27 mg/dL    Sodium 136 136 - 145 mmol/L    Potassium 3.7 3.5 - 5.2 mmol/L    Chloride 102 98 - 107 mmol/L    CO2 25.5 22.0 - 29.0 mmol/L    Calcium 8.8 8.6 - 10.5 mg/dL    Total Protein 7.3 6.0 - 8.5 g/dL    Albumin 4.00 3.50 - 5.20 g/dL    ALT (SGPT) 12 1 - 41 U/L    AST (SGOT) 14 1 - 40 U/L    Alkaline Phosphatase 85 39 - 117 U/L    Total Bilirubin 0.7 0.0 - 1.2 mg/dL    eGFR Non African Amer 111 >60  mL/min/1.73    Globulin 3.3 gm/dL    A/G Ratio 1.2 g/dL    BUN/Creatinine Ratio 12.6 7.0 - 25.0    Anion Gap 8.5 5.0 - 15.0 mmol/L   Lactic Acid, Plasma    Collection Time: 06/22/21  2:25 AM    Specimen: Blood   Result Value Ref Range    Lactate 0.7 0.5 - 2.0 mmol/L   Procalcitonin    Collection Time: 06/22/21  2:25 AM    Specimen: Blood   Result Value Ref Range    Procalcitonin 0.11 0.00 - 0.25 ng/mL   Sedimentation Rate    Collection Time: 06/22/21  2:25 AM    Specimen: Blood   Result Value Ref Range    Sed Rate 33 (H) 0 - 15 mm/hr   C-reactive Protein    Collection Time: 06/22/21  2:25 AM    Specimen: Blood   Result Value Ref Range    C-Reactive Protein 12.82 (H) 0.00 - 0.50 mg/dL   CBC Auto Differential    Collection Time: 06/22/21  2:25 AM    Specimen: Blood   Result Value Ref Range    WBC 15.68 (H) 3.40 - 10.80 10*3/mm3    RBC 4.64 4.14 - 5.80 10*6/mm3    Hemoglobin 13.7 13.0 - 17.7 g/dL    Hematocrit 38.9 37.5 - 51.0 %    MCV 83.8 79.0 - 97.0 fL    MCH 29.5 26.6 - 33.0 pg    MCHC 35.2 31.5 - 35.7 g/dL    RDW 11.8 (L) 12.3 - 15.4 %    RDW-SD 35.9 (L) 37.0 - 54.0 fl    MPV 9.2 6.0 - 12.0 fL    Platelets 277 140 - 450 10*3/mm3    Neutrophil % 67.1 42.7 - 76.0 %    Lymphocyte % 21.2 19.6 - 45.3 %    Monocyte % 10.3 5.0 - 12.0 %    Eosinophil % 0.4 0.3 - 6.2 %    Basophil % 0.4 0.0 - 1.5 %    Immature Grans % 0.6 (H) 0.0 - 0.5 %    Neutrophils, Absolute 10.52 (H) 1.70 - 7.00 10*3/mm3    Lymphocytes, Absolute 3.33 (H) 0.70 - 3.10 10*3/mm3    Monocytes, Absolute 1.62 (H) 0.10 - 0.90 10*3/mm3    Eosinophils, Absolute 0.06 0.00 - 0.40 10*3/mm3    Basophils, Absolute 0.06 0.00 - 0.20 10*3/mm3    Immature Grans, Absolute 0.09 (H) 0.00 - 0.05 10*3/mm3    nRBC 0.0 0.0 - 0.2 /100 WBC   Ethanol    Collection Time: 06/22/21  3:59 AM    Specimen: Blood   Result Value Ref Range    Ethanol <10 0 - 10 mg/dL    Ethanol % <0.010 %   Basic Metabolic Panel    Collection Time: 06/22/21  5:51 AM    Specimen: Blood   Result Value Ref  Range    Glucose 84 65 - 99 mg/dL    BUN 11 6 - 20 mg/dL    Creatinine 0.88 0.76 - 1.27 mg/dL    Sodium 137 136 - 145 mmol/L    Potassium 3.7 3.5 - 5.2 mmol/L    Chloride 107 98 - 107 mmol/L    CO2 23.6 22.0 - 29.0 mmol/L    Calcium 7.6 (L) 8.6 - 10.5 mg/dL    eGFR Non African Amer 109 >60 mL/min/1.73    BUN/Creatinine Ratio 12.5 7.0 - 25.0    Anion Gap 6.4 5.0 - 15.0 mmol/L   CBC Auto Differential    Collection Time: 06/22/21  5:51 AM    Specimen: Blood   Result Value Ref Range    WBC 11.38 (H) 3.40 - 10.80 10*3/mm3    RBC 4.15 4.14 - 5.80 10*6/mm3    Hemoglobin 12.1 (L) 13.0 - 17.7 g/dL    Hematocrit 35.5 (L) 37.5 - 51.0 %    MCV 85.5 79.0 - 97.0 fL    MCH 29.2 26.6 - 33.0 pg    MCHC 34.1 31.5 - 35.7 g/dL    RDW 12.3 12.3 - 15.4 %    RDW-SD 38.4 37.0 - 54.0 fl    MPV 9.5 6.0 - 12.0 fL    Platelets 224 140 - 450 10*3/mm3    Neutrophil % 57.2 42.7 - 76.0 %    Lymphocyte % 29.4 19.6 - 45.3 %    Monocyte % 11.7 5.0 - 12.0 %    Eosinophil % 0.9 0.3 - 6.2 %    Basophil % 0.4 0.0 - 1.5 %    Immature Grans % 0.4 0.0 - 0.5 %    Neutrophils, Absolute 6.51 1.70 - 7.00 10*3/mm3    Lymphocytes, Absolute 3.35 (H) 0.70 - 3.10 10*3/mm3    Monocytes, Absolute 1.33 (H) 0.10 - 0.90 10*3/mm3    Eosinophils, Absolute 0.10 0.00 - 0.40 10*3/mm3    Basophils, Absolute 0.05 0.00 - 0.20 10*3/mm3    Immature Grans, Absolute 0.04 0.00 - 0.05 10*3/mm3    nRBC 0.0 0.0 - 0.2 /100 WBC   Protime-INR    Collection Time: 06/22/21  5:51 AM    Specimen: Blood   Result Value Ref Range    Protime 16.7 (H) 11.7 - 14.2 Seconds    INR 1.37 (H) 0.90 - 1.10       Ordered the above labs and independently reviewed the results.        RADIOLOGY  XR Ankle 3+ View Left    Result Date: 6/22/2021  Patient: ALESSANDRO HARO  Time Out: 03:51 Exam(s): FILM LEFT ANKLE EXAM:   XR Left Ankle Complete, 3 or More Views CLINICAL HISTORY:    Reason for exam: ankle pain. TECHNIQUE:   Frontal, lateral and oblique views of the left ankle. COMPARISON:   No relevant prior  studies available. FINDINGS:   Lateral soft tissue swelling.  No acute fracture or malalignment.  No focal osseous abnormality.  Ankle mortise is congruent. IMPRESSION:     Lateral soft tissue swelling.  No acute fracture.     Electronically signed by Alfred Sawyer M.D. on 06-22-21 at 0351      I ordered the above noted radiological studies. Reviewed by me and discussed with radiologist.  See dictation for official radiology interpretation.      PROCEDURES    Procedures      MEDICATIONS GIVEN IN ER    Medications   sodium chloride 0.9 % flush 10 mL (has no administration in time range)   sodium chloride 0.9 % flush 10 mL (has no administration in time range)   sodium chloride 0.9 % flush 10 mL (has no administration in time range)   acetaminophen (TYLENOL) tablet 650 mg (has no administration in time range)     Or   acetaminophen (TYLENOL) 160 MG/5ML solution 650 mg (has no administration in time range)     Or   acetaminophen (TYLENOL) suppository 650 mg (has no administration in time range)   ondansetron (ZOFRAN) injection 4 mg (has no administration in time range)   ketorolac (TORADOL) injection 30 mg (has no administration in time range)   vancomycin 1500 mg/500 mL 0.9% NS IVPB ([facility]) (0 mg Intravenous Stopped 6/22/21 0538)   piperacillin-tazobactam (ZOSYN) 3.375 g in iso-osmotic dextrose 50 ml (premix) (0 g Intravenous Stopped 6/22/21 0342)   ketorolac (TORADOL) injection 30 mg (30 mg Intravenous Given 6/22/21 0323)   sodium chloride 0.9 % bolus 1,000 mL (0 mL Intravenous Stopped 6/22/21 0445)   sodium chloride 0.9 % bolus 1,000 mL (0 mL Intravenous Stopped 6/22/21 0538)         PROGRESS, DATA ANALYSIS, CONSULTS, AND MEDICAL DECISION MAKING    All labs have been independently reviewed by me.  All radiology studies have been reviewed by me and discussed with radiologist dictating the report.   EKG's independently viewed and interpreted by me.  Discussion below represents my analysis of pertinent findings related to  patient's condition, differential diagnosis, treatment plan and final disposition.    Working diagnosis is left lower extremity cellulitis.  Given patient's lab findings, physical exam findings, and concerns about the patient filling his medication and following instructions/treatment plan in an outpatient setting, is felt to be better admitted at this time.  We will place a call to the hospitalist.    ED Course as of Jun 22 0638   Tue Jun 22, 2021   0406 WBC(!): 15.68 [RC]   0407 C-Reactive Protein(!): 12.82 [RC]   0407 Sed Rate(!): 33 [RC]   0407 RBC: 4.64 [RC]   0407 Hemoglobin: 13.7 [RC]   0407 Hematocrit: 38.9 [RC]   0407 Platelets: 277 [RC]   0407 Glucose: 95 [RC]   0407 BUN: 11 [RC]   0407 Creatinine: 0.87 [RC]   0407 Sodium: 136 [RC]   0407 Potassium: 3.7 [RC]   0407 CO2: 25.5 [RC]   0407 Anion Gap: 8.5 [RC]   0407 ALT (SGPT): 12 [RC]   0407 AST (SGOT): 14 [RC]   0407 Alkaline Phosphatase: 85 [RC]   0407 Total Bilirubin: 0.7 [RC]   0407 Lactate: 0.7 [RC]   0407 Procalcitonin: 0.11 [RC]   0535 Discussed the patient's case with SVEN Roberts with [facility].  To admit the patient to Dr. Whitlock's care.    [RC]      ED Course User Index  [RC] Kory Brown III, PA       Patient was placed in face mask in first look. Patient was wearing facemask when I entered the room and throughout our encounter. I wore full protective equipment throughout this patient encounter including a face mask, eye shield, gown and gloves. Hand hygiene was performed before donning protective equipment and after removal when leaving the room.      AS OF 06:38 EDT VITALS:    BP - 102/70  HR - 65  TEMP - 98.7 °F (37.1 °C) (Oral)  O2 SATS - 98%        DIAGNOSIS  Final diagnoses:   Cellulitis of left lower extremity   Leukocytosis, unspecified type   History of substance abuse (CMS/HCC)         DISPOSITION  ADMISSION    Discussed treatment plan and reason for admission with pt/family and admitting physician.  Pt/family voiced  understanding of the plan for admission for further testing/treatment as needed.              Kory Brown III, PA  06/22/21 0618

## 2021-06-22 NOTE — PLAN OF CARE
Goal Outcome Evaluation:  Plan of Care Reviewed With: (P) patient        Progress: (P) no change  Outcome Summary: (P) Pt admitted [facility] this morning from the ER. Diagnosed with cellulitis of the left lower extremity and leukocytosis. Pt is alert and oriented x4 but has been lethargic and withdrawn this shift. Pt has hx of substance abuse; provided urine sample that tested positive for amphetamines. Pt is homeless and does not have a lot family or friend support. Pt is encouraged to call when he needs to go to bathroom due to weakness and pain in his left foot. Neurovascular checks q4h. MRSA swab taken from nares and sent to lab. Pt has NS running through IV at 100ml/hr; Pt is to receive IVPB zosyn and vancomycin. Difficult to get answers out of patients at times; pt falls asleep easily. Pt rests comfortably between care and denies need for pain medication at this time. Will continue to monitor.

## 2021-06-23 VITALS
TEMPERATURE: 97 F | RESPIRATION RATE: 16 BRPM | OXYGEN SATURATION: 100 % | BODY MASS INDEX: 20.51 KG/M2 | HEART RATE: 47 BPM | DIASTOLIC BLOOD PRESSURE: 66 MMHG | SYSTOLIC BLOOD PRESSURE: 109 MMHG | WEIGHT: 165 LBS | HEIGHT: 75 IN

## 2021-06-23 PROCEDURE — 25010000002 VANCOMYCIN PER 500 MG: Performed by: HOSPITALIST

## 2021-06-23 PROCEDURE — 96366 THER/PROPH/DIAG IV INF ADDON: CPT

## 2021-06-23 PROCEDURE — 25010000002 PIPERACILLIN SOD-TAZOBACTAM PER 1 G: Performed by: HOSPITALIST

## 2021-06-23 PROCEDURE — 96361 HYDRATE IV INFUSION ADD-ON: CPT

## 2021-06-23 PROCEDURE — 96368 THER/DIAG CONCURRENT INF: CPT

## 2021-06-23 PROCEDURE — G0378 HOSPITAL OBSERVATION PER HR: HCPCS

## 2021-06-23 RX ADMIN — TAZOBACTAM SODIUM AND PIPERACILLIN SODIUM 3.38 G: 375; 3 INJECTION, SOLUTION INTRAVENOUS at 06:53

## 2021-06-23 RX ADMIN — VANCOMYCIN HYDROCHLORIDE 1000 MG: 1 INJECTION, SOLUTION INTRAVENOUS at 01:33

## 2021-06-23 RX ADMIN — SODIUM CHLORIDE, PRESERVATIVE FREE 10 ML: 5 INJECTION INTRAVENOUS at 09:16

## 2021-06-23 RX ADMIN — VANCOMYCIN HYDROCHLORIDE 1000 MG: 1 INJECTION, SOLUTION INTRAVENOUS at 09:16

## 2021-06-23 RX ADMIN — TAZOBACTAM SODIUM AND PIPERACILLIN SODIUM 3.38 G: 375; 3 INJECTION, SOLUTION INTRAVENOUS at 01:33

## 2021-06-23 RX ADMIN — SODIUM CHLORIDE 100 ML/HR: 9 INJECTION, SOLUTION INTRAVENOUS at 06:53

## 2021-06-23 NOTE — NURSING NOTE
Pt decided to leave AMA after RN educated on reasons to stay in hospital for treatment of his celluitis and the potential risks of leaving. Pt still refused to stay, demanding his IV to be taken out. RN and CCP tried to get pt to stay until we could get a PO antibiotic for him to leave from MD but pt left down the stairwell in the hospital. Pt also refused to sign the A paperwork.     MD notified and  notified.

## 2021-06-23 NOTE — DISCHARGE SUMMARY
PHYSICIAN DISCHARGE SUMMARY                                                                        [facility]    Patient Identification:  Name: Get Coronado  Age: 21 y.o.  Sex: male  :  2000  MRN: [id number]  Primary Care Physician: Provider, No Known    Admit date: 2021  Discharge date and time:2021  Discharged Condition: Patient left AGAINST MEDICAL ADVICE    Discharge Diagnoses:  Active Hospital Problems    Diagnosis  POA   • **Cellulitis of left lower extremity [L03.116]  Yes   • History of substance abuse (CMS/[facility]) [F19.11]  Unknown   • Leukocytosis [D72.829]  Unknown      Resolved Hospital Problems   No resolved problems to display.          PMHX: History reviewed. No pertinent past medical history.  PSHX: History reviewed. No pertinent surgical history.    Hospital Course: Get Coronado  is a 21 y.o. male who presents to the hospital complaining of left lower extremity pain and swelling that has been ongoing for the past 3 to 4 days.  Patient states it started at the back of the ankle and now has spread to encompass the foot, ankle, and extends of the lower third of the leg.  He states it is red and warm to the touch.  He denies fever but endorses chills.  He denies recent injury.     Reviewing the patient's chart he apparently has a past medical history of substance abuse, asthma.        The patient was evaluated in the ER and appeared to have significant cellulitis in the left lower extremity.  He was started on broad-spectrum IV antibiotics and admitted for further evaluation treatment of this.         The patient was admitted and received IV antibiotics for cellulitis on the left foot and leg.  He was feeling better after being in the hospital for a day or so and decided to leave AGAINST MEDICAL ADVICE.  It is unknown if he will follow-up with anyone.    Consults:     Consults     Date and Time Order  Name Status Description    6/22/2021  4:06 AM HUI (on-call MD unless specified) Details Completed         Results from last 7 days   Lab Units 06/22/21  0551   WBC 10*3/mm3 11.38*   HEMOGLOBIN g/dL 12.1*   HEMATOCRIT % 35.5*   PLATELETS 10*3/mm3 224     Results from last 7 days   Lab Units 06/22/21  0551   SODIUM mmol/L 137   POTASSIUM mmol/L 3.7   CHLORIDE mmol/L 107   CO2 mmol/L 23.6   BUN mg/dL 11   CREATININE mg/dL 0.88   GLUCOSE mg/dL 84   CALCIUM mg/dL 7.6*     Significant Diagnostic Studies:   WBC   Date Value Ref Range Status   06/22/2021 11.38 (H) 3.40 - 10.80 10*3/mm3 Final     Hemoglobin   Date Value Ref Range Status   06/22/2021 12.1 (L) 13.0 - 17.7 g/dL Final     Hematocrit   Date Value Ref Range Status   06/22/2021 35.5 (L) 37.5 - 51.0 % Final     Platelets   Date Value Ref Range Status   06/22/2021 224 140 - 450 10*3/mm3 Final     Sodium   Date Value Ref Range Status   06/22/2021 137 136 - 145 mmol/L Final     Potassium   Date Value Ref Range Status   06/22/2021 3.7 3.5 - 5.2 mmol/L Final     Chloride   Date Value Ref Range Status   06/22/2021 107 98 - 107 mmol/L Final     CO2   Date Value Ref Range Status   06/22/2021 23.6 22.0 - 29.0 mmol/L Final     BUN   Date Value Ref Range Status   06/22/2021 11 6 - 20 mg/dL Final     Creatinine   Date Value Ref Range Status   06/22/2021 0.88 0.76 - 1.27 mg/dL Final     Glucose   Date Value Ref Range Status   06/22/2021 84 65 - 99 mg/dL Final     Calcium   Date Value Ref Range Status   06/22/2021 7.6 (L) 8.6 - 10.5 mg/dL Final     AST (SGOT)   Date Value Ref Range Status   06/22/2021 14 1 - 40 U/L Final     ALT (SGPT)   Date Value Ref Range Status   06/22/2021 12 1 - 41 U/L Final     Alkaline Phosphatase   Date Value Ref Range Status   06/22/2021 85 39 - 117 U/L Final     INR   Date Value Ref Range Status   06/22/2021 1.37 (H) 0.90 - 1.10 Final     No results found for: COLORU, CLARITYU, SPECGRAV, PHUR, PROTEINUR, GLUCOSEU, KETONESU, BLOODU, NITRITE,  LEUKOCYTESUR, BILIRUBINUR, UROBILINOGEN, RBCUA, WBCUA, BACTERIA, UACOMMENT  No results found for: TROPONINT, TROPONINI, BNP  No components found for: HGBA1C;2  No components found for: TSH;2  Imaging Results (All)     Procedure Component Value Units Date/Time    XR Ankle 3+ View Left [[id number]] Collected: 06/22/21 0352     Updated: 06/22/21 0352    Narrative:        Patient: ALESSANDRO HARO  Time Out: 03:51  Exam(s): FILM LEFT ANKLE     EXAM:    XR Left Ankle Complete, 3 or More Views    CLINICAL HISTORY:     Reason for exam: ankle pain.    TECHNIQUE:    Frontal, lateral and oblique views of the left ankle.    COMPARISON:    No relevant prior studies available.    FINDINGS:    Lateral soft tissue swelling.  No acute fracture or malalignment.  No   focal osseous abnormality.  Ankle mortise is congruent.    IMPRESSION:       Lateral soft tissue swelling.  No acute fracture.      Impression:          Electronically signed by Alfred Sawyer M.D. on 06-22-21 at 0351        Lab Results (last 7 days)     Procedure Component Value Units Date/Time    Blood Culture - Blood, Arm, Right [[id number]] Collected: 06/22/21 0226    Specimen: Blood from Arm, Right Updated: 06/23/21 0245     Blood Culture No growth at 24 hours    Blood Culture - Blood, Arm, Right [[id number]] Collected: 06/22/21 0225    Specimen: Blood from Arm, Right Updated: 06/23/21 0245     Blood Culture No growth at 24 hours    MRSA Screen, PCR (Inpatient) - Swab, Nares [[id number]]  (Normal) Collected: 06/22/21 1738    Specimen: Swab from Nares Updated: 06/22/21 1928     MRSA PCR No MRSA Detected    Urine Drug Screen - Urine, Clean Catch [[id number]]  (Abnormal) Collected: 06/22/21 1159    Specimen: Urine, Clean Catch Updated: 06/22/21 1300     Amphet/Methamphet, Screen Positive     Barbiturates Screen, Urine Negative     Benzodiazepine Screen, Urine Negative     Cocaine Screen, Urine Negative     Opiate Screen Negative     THC, Screen, Urine Negative     Methadone  Screen, Urine Negative     Oxycodone Screen, Urine Negative    Narrative:      Negative Thresholds Per Drugs Screened:    Amphetamines                 500 ng/ml  Barbiturates                 200 ng/ml  Benzodiazepines              100 ng/ml  Cocaine                      300 ng/ml  Methadone                    300 ng/ml  Opiates                      300 ng/ml  Oxycodone                    100 ng/ml  THC                           50 ng/ml    The Normal Value for all drugs tested is negative. This report includes final unconfirmed screening results to be used for medical treatment purposes only. Unconfirmed results must not be used for non-medical purposes such as employment or legal testing. Clinical consideration should be applied to any drug of abuse test, particularly when unconfirmed results are used.            COVID PRE-OP / PRE-PROCEDURE SCREENING ORDER (NO ISOLATION) - Swab, Nasopharynx [[id number]]  (Normal) Collected: 06/22/21 0230    Specimen: Swab from Nasopharynx Updated: 06/22/21 1140    Narrative:      The following orders were created for panel order COVID PRE-OP / PRE-PROCEDURE SCREENING ORDER (NO ISOLATION) - Swab, Nasopharynx.  Procedure                               Abnormality         Status                     ---------                               -----------         ------                     COVID-19,APTIMA PANTHER,...[[id number]]  Normal              Final result                 Please view results for these tests on the individual orders.    COVID-19,[facility]TIMA [facility] IN-HOUSE, NP/OP SWAB IN UTM/VTM/[facility]AL[facility] TRANSPORT MEDIA,24 HR TAT - Swab, Nasopharynx [[id number]]  (Normal) Collected: 06/22/21 0230    Specimen: Swab from Nasopharynx Updated: 06/22/21 1140     COVID19 Not Detected    Narrative:      Fact sheet for providers: https://www.fda.gov/media/368343/download     Fact sheet for patients: https://www.fda.gov/media/864793/download    Test performed by RT PCR.    Protime-INR [[id number]]   (Abnormal) Collected: 06/22/21 0551    Specimen: Blood Updated: 06/22/21 0626     Protime 16.7 Seconds      INR 1.37    Basic Metabolic Panel [[id number]]  (Abnormal) Collected: 06/22/21 0551    Specimen: Blood Updated: 06/22/21 0623     Glucose 84 mg/dL      BUN 11 mg/dL      Creatinine 0.88 mg/dL      Sodium 137 mmol/L      Potassium 3.7 mmol/L      Chloride 107 mmol/L      CO2 23.6 mmol/L      Calcium 7.6 mg/dL      eGFR Non African Amer 109 mL/min/1.73      BUN/Creatinine Ratio 12.5     Anion Gap 6.4 mmol/L     Narrative:      GFR Normal >60  Chronic Kidney Disease <60  Kidney Failure <15      CBC Auto Differential [[id number]]  (Abnormal) Collected: 06/22/21 0551    Specimen: Blood Updated: 06/22/21 0607     WBC 11.38 10*3/mm3      RBC 4.15 10*6/mm3      Hemoglobin 12.1 g/dL      Hematocrit 35.5 %      MCV 85.5 fL      MCH 29.2 pg      MCHC 34.1 g/dL      RDW 12.3 %      RDW-SD 38.4 fl      MPV 9.5 fL      Platelets 224 10*3/mm3      Neutrophil % 57.2 %      Lymphocyte % 29.4 %      Monocyte % 11.7 %      Eosinophil % 0.9 %      Basophil % 0.4 %      Immature Grans % 0.4 %      Neutrophils, Absolute 6.51 10*3/mm3      Lymphocytes, Absolute 3.35 10*3/mm3      Monocytes, Absolute 1.33 10*3/mm3      Eosinophils, Absolute 0.10 10*3/mm3      Basophils, Absolute 0.05 10*3/mm3      Immature Grans, Absolute 0.04 10*3/mm3      nRBC 0.0 /100 WBC     Ethanol [[id number]] Collected: 06/22/21 0359    Specimen: Blood Updated: 06/22/21 0436     Ethanol <10 mg/dL      Ethanol % <0.010 %     Sedimentation Rate [[id number]]  (Abnormal) Collected: 06/22/21 0225    Specimen: Blood Updated: 06/22/21 0308     Sed Rate 33 mm/hr     Procalcitonin [[id number]]  (Normal) Collected: 06/22/21 0225    Specimen: Blood Updated: 06/22/21 0303     Procalcitonin 0.11 ng/mL     Narrative:      As a Marker for Sepsis (Non-Neonates):     1. <0.5 ng/mL represents a low risk of severe sepsis and/or septic shock.  2. >2 ng/mL represents a high risk of  "severe sepsis and/or septic shock.    As a Marker for Lower Respiratory Tract Infections that require antibiotic therapy:  PCT on Admission     Antibiotic Therapy             6-12 Hrs later  >0.5                          Strongly Recommended            >0.25 - <0.5             Recommended  0.1 - 0.25                  Discouraged                       Remeasure/reassess PCT  <0.1                         Strongly Discouraged         Remeasure/reassess PCT      As 28 day mortality risk marker: \"Change in Procalcitonin Result\" (>80% or <=80%) if Day 0 (or Day 1) and Day 4 values are available. Refer to http://www.[facility] Prague[facility]meriPathpct-calculator.com/    Change in PCT <=80 %   A decrease of PCT levels below or equal to 80% defines a positive change in PCT test result representing a higher risk for 28-day all-cause mortality of patients diagnosed with severe sepsis or septic shock.    Change in PCT >80 %   A decrease of PCT levels of more than 80% defines a negative change in PCT result representing a lower risk for 28-day all-cause mortality of patients diagnosed with severe sepsis or septic shock.              Results may be falsely decreased if patient taking Biotin.     Comprehensive Metabolic Panel [[id number]] Collected: 06/22/21 0225    Specimen: Blood Updated: 06/22/21 0257     Glucose 95 mg/dL      BUN 11 mg/dL      Creatinine 0.87 mg/dL      Sodium 136 mmol/L      Potassium 3.7 mmol/L      Chloride 102 mmol/L      CO2 25.5 mmol/L      Calcium 8.8 mg/dL      Total Protein 7.3 g/dL      Albumin 4.00 g/dL      ALT (SGPT) 12 U/L      AST (SGOT) 14 U/L      Alkaline Phosphatase 85 U/L      Total Bilirubin 0.7 mg/dL      eGFR Non African Amer 111 mL/min/1.73      Globulin 3.3 gm/dL      A/G Ratio 1.2 g/dL      BUN/Creatinine Ratio 12.6     Anion Gap 8.5 mmol/L     Narrative:      GFR Normal >60  Chronic Kidney Disease <60  Kidney Failure <15      C-reactive Protein [[id number]]  (Abnormal) Collected: 06/22/21 0225    Specimen: " "Blood Updated: 06/22/21 0257     C-Reactive Protein 12.82 mg/dL     Lactic Acid, Plasma [[id number]]  (Normal) Collected: 06/22/21 0225    Specimen: Blood Updated: 06/22/21 0254     Lactate 0.7 mmol/L     CBC & Differential [[id number]]  (Abnormal) Collected: 06/22/21 0225    Specimen: Blood Updated: 06/22/21 0240    Narrative:      The following orders were created for panel order CBC & Differential.  Procedure                               Abnormality         Status                     ---------                               -----------         ------                     CBC Auto Differential[[id number]]        Abnormal            Final result                 Please view results for these tests on the individual orders.    CBC Auto Differential [[id number]]  (Abnormal) Collected: 06/22/21 0225    Specimen: Blood Updated: 06/22/21 0240     WBC 15.68 10*3/mm3      RBC 4.64 10*6/mm3      Hemoglobin 13.7 g/dL      Hematocrit 38.9 %      MCV 83.8 fL      MCH 29.5 pg      MCHC 35.2 g/dL      RDW 11.8 %      RDW-SD 35.9 fl      MPV 9.2 fL      Platelets 277 10*3/mm3      Neutrophil % 67.1 %      Lymphocyte % 21.2 %      Monocyte % 10.3 %      Eosinophil % 0.4 %      Basophil % 0.4 %      Immature Grans % 0.6 %      Neutrophils, Absolute 10.52 10*3/mm3      Lymphocytes, Absolute 3.33 10*3/mm3      Monocytes, Absolute 1.62 10*3/mm3      Eosinophils, Absolute 0.06 10*3/mm3      Basophils, Absolute 0.06 10*3/mm3      Immature Grans, Absolute 0.09 10*3/mm3      nRBC 0.0 /100 WBC         /66 (BP Location: Right arm, Patient Position: Lying)   Pulse (!) 47   Temp 97 °F (36.1 °C) (Oral)   Resp 16   Ht 190.5 cm (75\")   Wt 74.8 kg (165 lb)   SpO2 100%   BMI 20.62 kg/m²     Discharge Exam:  General Appearance:    Alert, cooperative, no distress                          Head:    Normocephalic, without obvious abnormality, atraumatic                          Eyes:                            Throat:   Lips, tongue, gums " normal                          Neck:   Supple, symmetrical, trachea midline, no JVD                        Lungs:     Clear to auscultation bilaterally, respirations unlabored                Chest Wall:    No tenderness or deformity                        Heart:    Regular rate and rhythm, S1 and S2 normal, no murmur,no  Rub or gallop                  Abdomen:     Soft, non-tender, bowel sounds active, no masses, no organomegaly                  Extremities:   Extremities normal, atraumatic, no cyanosis or edema                             Skin:   Skin is warm and dry,  no rashes or palpable lesions                  Neurologic:   no focal deficits noted     Disposition:  Patient left AMA    Patient Instructions:      Discharge Medications      Patient Not Prescribed Medications Upon Discharge       No future appointments.    Discharge Order (From admission, onward)    None          Total time spent discharging patient including evaluation,post hospitalization follow up,  medication and post hospitalization instructions and education total time exceeds 30 minutes.    Signed:  Alon Montez MD  6/23/2021  13:44 EDT

## 2021-06-23 NOTE — PROGRESS NOTES
Case Management Discharge Note      Final Note: Left AMA on 6/23/21. TUSHAR Guerrero RN, CCP    Provided Post Acute Provider List?: N/A  N/A Provider List Comment: The patient denies needs upon d/c.  Provided Post Acute Provider Quality & Resource List?: N/A  N/A Quality & Resource List Comment: The patient denies needs upon d/c.    Selected Continued Care - Discharged on 6/23/2021 Admission date: 6/22/2021 - Discharge disposition: Left Against Medical Advice    Destination    No services have been selected for the patient.              Durable Medical Equipment    No services have been selected for the patient.              Dialysis/Infusion    No services have been selected for the patient.              Home Medical Care    No services have been selected for the patient.              Therapy    No services have been selected for the patient.              Community Resources    No services have been selected for the patient.              Community & DME    No services have been selected for the patient.                       Final Discharge Disposition Code: 07 - left AMA

## 2021-06-23 NOTE — PLAN OF CARE
Goal Outcome Evaluation:  Outcome summary  Pt AA&O and agrees to notify staff of needs, pt denies c/o pain. IVF. IV abx. Continue to monitor and tx per POC and MD orders.

## 2021-06-23 NOTE — PROGRESS NOTES
Discharge Planning Assessment  [facility]     Patient Name: Get Coronado  MRN: [id number]  Today's Date: 6/23/2021    Admit Date: 6/22/2021    Discharge Needs Assessment    No documentation.       Discharge Plan     Row Name 06/23/21 1358       Plan    Plan Comments  Spoke with the patient and he states he will wait for MD to prescribe po antib. so that they could be delivered to room. The patient went ahead an left AMA and did not wait for the medication. TUSHAR Guerrero RN, CCP.    Final Discharge Disposition Code  07 - left AMA    Final Note  Left AMA on 6/23/21. TUSHAR Guerrero RN, CCP        Continued Care and Services - Discharged on 6/23/2021 Admission date: 6/22/2021 - Discharge disposition: Left Against Medical Advice   Coordination has not been started for this encounter.         Demographic Summary    No documentation.       Functional Status    No documentation.       Psychosocial    No documentation.       Abuse/Neglect    No documentation.       Legal    No documentation.       Substance Abuse    No documentation.       Patient Forms    No documentation.           Tri Guerrero, RN

## 2021-06-23 NOTE — NURSING NOTE
RN discussed unit/hospital policy about leaving the unit. RN told pt that he must stay on the unit while walking and cannot get on any elevators. Pt seemed frustrated when RN discussed this with him.

## 2021-06-27 LAB
BACTERIA SPEC AEROBE CULT: NORMAL
BACTERIA SPEC AEROBE CULT: NORMAL

## 2021-07-25 ENCOUNTER — APPOINTMENT (OUTPATIENT)
Dept: GENERAL RADIOLOGY | Facility: HOSPITAL | Age: 21
End: 2021-07-25

## 2021-07-25 ENCOUNTER — HOSPITAL ENCOUNTER (EMERGENCY)
Facility: HOSPITAL | Age: 21
Discharge: HOME | End: 2021-07-25
Attending: EMERGENCY MEDICINE | Admitting: EMERGENCY MEDICINE

## 2021-07-25 VITALS
OXYGEN SATURATION: 98 % | HEART RATE: 95 BPM | TEMPERATURE: 98 F | HEIGHT: 75 IN | RESPIRATION RATE: 17 BRPM | SYSTOLIC BLOOD PRESSURE: 116 MMHG | DIASTOLIC BLOOD PRESSURE: 79 MMHG | BODY MASS INDEX: 20.62 KG/M2

## 2021-07-25 DIAGNOSIS — S62.306A CLOSED DISPLACED FRACTURE OF FIFTH METACARPAL BONE OF RIGHT HAND, UNSPECIFIED PORTION OF METACARPAL, INITIAL ENCOUNTER: Primary | ICD-10-CM

## 2021-07-25 PROCEDURE — 73090 X-RAY EXAM OF FOREARM: CPT

## 2021-07-25 PROCEDURE — 99283 EMERGENCY DEPT VISIT LOW MDM: CPT

## 2021-07-25 PROCEDURE — 73110 X-RAY EXAM OF WRIST: CPT

## 2021-07-25 PROCEDURE — 73130 X-RAY EXAM OF HAND: CPT

## 2021-07-25 PROCEDURE — 73070 X-RAY EXAM OF ELBOW: CPT

## 2021-07-25 RX ORDER — HYDROCODONE BITARTRATE AND ACETAMINOPHEN 5; 325 MG/1; MG/1
1 TABLET ORAL EVERY 6 HOURS PRN
Qty: 9 TABLET | Refills: 0 | Status: SHIPPED | OUTPATIENT
Start: 2021-07-25 | End: 2021-07-28

## 2021-07-25 RX ORDER — HYDROCODONE BITARTRATE AND ACETAMINOPHEN 5; 325 MG/1; MG/1
1 TABLET ORAL ONCE
Status: COMPLETED | OUTPATIENT
Start: 2021-07-25 | End: 2021-07-25

## 2021-07-25 RX ADMIN — HYDROCODONE BITARTRATE AND ACETAMINOPHEN 1 TABLET: 5; 325 TABLET ORAL at 21:03

## 2021-07-25 NOTE — ED NOTES
Pt to ED triage c/o R hand injury. Pt presents with swelling to R posterior hand, able to move all fingers without complication, and holding/drinking milkshake at triage with injured hand. Pt rates pain as 10/10 at this time and in NAD.     Patient was placed in face mask during first look triage.  Patient was wearing a face mask throughout encounter.  I wore personal protective equipment throughout the encounter.  Hand hygiene was performed before and after patient encounter.      Alberto Schmid, PERI  07/25/21 1937

## 2021-07-26 NOTE — DISCHARGE INSTRUCTIONS
Please return to the emergency department immediately if you have any pain, numbness, tingling, weakness, or any other concerning symptoms.    Please call Dr. Rivera's office first thing in the morning.  If you do not hear back from them.  He did tell me that he will try to see you on Tuesday, July 27, 2021.  However you need to schedule the appointment first thing in the morning tomorrow to be seen on Tuesday.    I have also sent a prescription for Lortab 5-325 mg every 6 as needed for pain for 3 days to [facility] located at [address] [place] [address], [place], [place].

## 2021-07-26 NOTE — ED PROVIDER NOTES
Subjective   21-year-old male with no significant past medical history here for chief complaint of right hand pain.  History was obtained from the patient.  The patient states that he was boxing for fun with a mattress.  He states that he was getting his aggression.  He states that when he went for a job he went to far down and hit the hand against a box ring.  He states that he has pain in his fifth digit.  He states that he cannot extend the fifth digit this is concerning to him and therefore he came to the ED.  He can flex the fifth metatarsal.  Patient states that the pain radiates from the fifth digit up to the elbow.  He states that the pain is sharp and worse with certain movements and improves with rest.  Patient denies any other injuries.  Patient denies being on any blood thinners.  He denies any injury to his head or neck.  He denies any chest pain, shortness of breath, abdominal pain, nausea, vomiting, diarrhea, fevers, chills, rashes, or any other symptoms.          Review of Systems   All other systems reviewed and are negative.      No past medical history on file.    No Known Allergies    No past surgical history on file.    No family history on file.    Social History     Socioeconomic History   • Marital status: Single     Spouse name: Not on file   • Number of children: Not on file   • Years of education: Not on file   • Highest education level: Not on file   Tobacco Use   • Smoking status: Current Every Day Smoker   • Smokeless tobacco: Never Used   Substance and Sexual Activity   • Alcohol use: Not Currently   • Drug use: Never   • Sexual activity: Defer           Objective   Physical Exam  Vitals reviewed.   Constitutional:       General: He is not in acute distress.     Appearance: He is not ill-appearing, toxic-appearing or diaphoretic.   HENT:      Head: Normocephalic and atraumatic.      Right Ear: External ear normal.      Left Ear: External ear normal.      Nose: Nose normal. No  congestion or rhinorrhea.      Mouth/Throat:      Mouth: Mucous membranes are moist.      Pharynx: Oropharynx is clear. No oropharyngeal exudate or posterior oropharyngeal erythema.   Eyes:      General: No scleral icterus.        Right eye: No discharge.         Left eye: No discharge.      Extraocular Movements: Extraocular movements intact.      Conjunctiva/sclera: Conjunctivae normal.      Pupils: Pupils are equal, round, and reactive to light.   Cardiovascular:      Rate and Rhythm: Normal rate and regular rhythm.      Pulses: Normal pulses.      Heart sounds: Normal heart sounds. No murmur heard.   No friction rub. No gallop.    Pulmonary:      Effort: Pulmonary effort is normal. No respiratory distress.      Breath sounds: Normal breath sounds. No stridor. No wheezing, rhonchi or rales.   Chest:      Chest wall: No tenderness.   Abdominal:      General: Abdomen is flat. Bowel sounds are normal. There is no distension.      Tenderness: There is no abdominal tenderness. There is no right CVA tenderness, left CVA tenderness, guarding or rebound.   Musculoskeletal:         General: Swelling (Right hand at the fifth digit), tenderness (Right fifth digit) and signs of injury (Right hand at the fifth digit) present. No deformity. Normal range of motion.      Cervical back: Normal range of motion and neck supple. No rigidity or tenderness.      Right lower leg: No edema.      Left lower leg: No edema.      Comments: Right upper extremity: 2+ radial pulses, soft compartments, tender to palpation of the right fifth digit mild swelling noted to the right hand at the fifth digit, no wrist tenderness noted, no tenderness to the forearm, no tenderness to the elbow, normal range of motion of the elbow, normal range of motion of the wrist, normal sensorimotor exam in ulnar and median distribution, right fifth digit the patient can flex but cannot extend    No injuries noted to any other extremities   Skin:     General: Skin  is warm and dry.      Capillary Refill: Capillary refill takes less than 2 seconds.      Coloration: Skin is not jaundiced or pale.   Neurological:      Mental Status: He is alert and oriented to person, place, and time.      Sensory: No sensory deficit.      Gait: Gait normal.   Psychiatric:         Mood and Affect: Mood normal.         Behavior: Behavior normal.         FX Dislocation    Date/Time: 7/25/2021 10:23 PM  Performed by: Kavon Fitzgerald MD  Authorized by: Kavon Fitzgerald MD     Consent:     Consent obtained:  Verbal    Consent given by:  Patient    Risks discussed:  Nerve damage, pain and vascular damage    Alternatives discussed:  No treatment  Injury:     Injury location:  Hand    Hand injury location:  R hand    Hand fracture type: fifth metacarpal    Pre-procedure assessment:     Neurological function: diminished      Neurological function comment:  Patient had difficulty time extending the fifth digit, patient could flex the fifth digit    Distal perfusion: normal      Range of motion: reduced    Anesthesia (see MAR for exact dosages):     Anesthesia method:  None  Procedure details:     Immobilization:  Sling and splint    Splint type:  Radial gutter    Supplies used:  Ortho-Glass  Post-procedure details:     Neurological function: diminished      Neurological function comment:  Patient had difficulty extending the fifth digit    Distal perfusion: normal      Range of motion: normal      Patient tolerance of procedure:  Tolerated well, no immediate complications  Comments:      I splinted the fifth metacarpal fracture with a radial gutter.  There was no fracture reduction done.               ED Course                                           MDM  Number of Diagnoses or Management Options     Amount and/or Complexity of Data Reviewed  Tests in the radiology section of CPT®: ordered and reviewed    Risk of Complications, Morbidity, and/or Mortality  General comments: When I first saw the patient, the  patient appeared nontoxic.  He was complaining of right fifth metacarpal pain.  He did state that his pain radiated up to the elbow.  Given this, I did get x-rays of the right upper extremity.  This showed that the patient had 1/5 metacarpal fracture.  I did speak to Dr. Rivera with hand surgery.  I did give him the pt's MRN to him and also how to contact the patient and the date of birth per his request.  I did express to him that the patient had 1/5 digit weakness when he came to the extension.  He told me that this was normal for the type of fracture the patient had.  He told me that he was going to look at the patient's x-rays. Dr. Rivera was comfortable with the patient going home.  He wanted the pt to get a radial gutter splint that was done.  He wanted to follow the patient up in his clinic.  He was going to reach out to the patient tomorrow morning to see him on Tuesday.  This was communicated to the patient.  The patient was told that if he does not hear back something from the office for an appointment to call first thing the morning tomorrow to discuss to see the hand surgeon on Tuesday.  The patient was given strict return precautions.  All questions were answered.  Patient tolerated the splinting well without any issues. Please refer to the procedure note for further details.  The patient was stable.  The patient did get a prescription for pain meds to [facility] 24 hours 5 325 mg of Lortab every 6 as needed for 3 days for pain control.  Patient expressed understanding and was glad to be discharged home.  Patient was discharged in stable condition.  All questions were answered prior to discharge.    Patient Progress  Patient progress: stable      Final diagnoses:   Closed displaced fracture of fifth metacarpal bone of right hand, unspecified portion of metacarpal, initial encounter       ED Disposition  ED Disposition     ED Disposition Condition Comment    Discharge Stable           Provider, No Known  [facility]  [place] [id number] [phone]    Schedule an appointment as soon as possible for a visit in 1 week      Rafa Rivera MD  [address], [facility] [address]  [place] [id number] [phone]    Schedule an appointment as soon as possible for a visit in 1 day  Please call tomorrow first thing in the morning to schedule this appointment.  I did talk to him and he can see you in clinic on Tuesday.         Medication List      New Prescriptions    HYDROcodone-acetaminophen 5-325 MG per tablet  Commonly known as: NORCO  Take 1 tablet by mouth Every 6 (Six) Hours As Needed for Moderate Pain  for up to 3 days.           Where to Get Your Medications      These medications were sent to [facility]/pharmacy #[id number] - [place], [place] - [address] - [phone]  - [phone]  [address], [facility] [address]    Hours: 24-hours Phone: [phone]   · HYDROcodone-acetaminophen 5-325 MG per tablet          Kavon Fitzgerald MD  07/25/21 4932

## 2021-07-26 NOTE — ED NOTES
"Pt states he punched a box spring to release some anger and felt  \"like a tendon pulled at the pinky finger\". Right hand is swollen. Denies any other sx at this time. This rn wearing mask and goggles. Pt wearing mask during encounter.        Paradise Langley, RN  07/25/21 6897    "

## 2021-07-26 NOTE — ED NOTES
Pt pacing around the room and hallway.pt was given box lunch.      Paradise Langley RN  07/25/21 2331

## 2022-01-13 ENCOUNTER — HOSPITAL ENCOUNTER (EMERGENCY)
Facility: HOSPITAL | Age: 22
Discharge: ANOTHER HOSPITAL | End: 2022-01-13
Attending: EMERGENCY MEDICINE | Admitting: EMERGENCY MEDICINE

## 2022-01-13 ENCOUNTER — APPOINTMENT (OUTPATIENT)
Dept: GENERAL RADIOLOGY | Facility: HOSPITAL | Age: 22
End: 2022-01-13

## 2022-01-13 VITALS
OXYGEN SATURATION: 99 % | SYSTOLIC BLOOD PRESSURE: 148 MMHG | RESPIRATION RATE: 15 BRPM | HEART RATE: 84 BPM | WEIGHT: 160 LBS | DIASTOLIC BLOOD PRESSURE: 96 MMHG | BODY MASS INDEX: 20.53 KG/M2 | HEIGHT: 74 IN | TEMPERATURE: 98.4 F
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
## 2022-01-13 DIAGNOSIS — L03.012 CELLULITIS OF FINGER OF LEFT HAND: ICD-10-CM

## 2022-01-13 DIAGNOSIS — S60.449A TIGHT RING ON FINGER: Primary | ICD-10-CM

## 2022-01-13 LAB
ANION GAP SERPL CALCULATED.3IONS-SCNC: 9.5 MMOL/L (ref 5–15)
BASOPHILS # BLD AUTO: 0.05 10*3/MM3 (ref 0–0.2)
BASOPHILS NFR BLD AUTO: 0.5 % (ref 0–1.5)
BUN SERPL-MCNC: 10 MG/DL (ref 6–20)
BUN/CREAT SERPL: 10.9 (ref 7–25)
CALCIUM SPEC-SCNC: 8.7 MG/DL (ref 8.6–10.5)
CHLORIDE SERPL-SCNC: 104 MMOL/L (ref 98–107)
CO2 SERPL-SCNC: 25.5 MMOL/L (ref 22–29)
CREAT SERPL-MCNC: 0.92 MG/DL (ref 0.76–1.27)
D-LACTATE SERPL-SCNC: 1.7 MMOL/L (ref 0.5–2)
DEPRECATED RDW RBC AUTO: 36.5 FL (ref 37–54)
EOSINOPHIL # BLD AUTO: 0.08 10*3/MM3 (ref 0–0.4)
EOSINOPHIL NFR BLD AUTO: 0.9 % (ref 0.3–6.2)
ERYTHROCYTE [DISTWIDTH] IN BLOOD BY AUTOMATED COUNT: 12 % (ref 12.3–15.4)
GFR SERPL CREATININE-BSD FRML MDRD: 104 ML/MIN/1.73
GLUCOSE SERPL-MCNC: 99 MG/DL (ref 65–99)
HCT VFR BLD AUTO: 41.2 % (ref 37.5–51)
HGB BLD-MCNC: 14.5 G/DL (ref 13–17.7)
IMM GRANULOCYTES # BLD AUTO: 0.01 10*3/MM3 (ref 0–0.05)
IMM GRANULOCYTES NFR BLD AUTO: 0.1 % (ref 0–0.5)
LYMPHOCYTES # BLD AUTO: 1.23 10*3/MM3 (ref 0.7–3.1)
LYMPHOCYTES NFR BLD AUTO: 13.5 % (ref 19.6–45.3)
MCH RBC QN AUTO: 29.8 PG (ref 26.6–33)
MCHC RBC AUTO-ENTMCNC: 35.2 G/DL (ref 31.5–35.7)
MCV RBC AUTO: 84.6 FL (ref 79–97)
MONOCYTES # BLD AUTO: 1.2 10*3/MM3 (ref 0.1–0.9)
MONOCYTES NFR BLD AUTO: 13.1 % (ref 5–12)
NEUTROPHILS NFR BLD AUTO: 6.56 10*3/MM3 (ref 1.7–7)
NEUTROPHILS NFR BLD AUTO: 71.9 % (ref 42.7–76)
NRBC BLD AUTO-RTO: 0 /100 WBC (ref 0–0.2)
PLATELET # BLD AUTO: 259 10*3/MM3 (ref 140–450)
PMV BLD AUTO: 9.3 FL (ref 6–12)
POTASSIUM SERPL-SCNC: 3.7 MMOL/L (ref 3.5–5.2)
RBC # BLD AUTO: 4.87 10*6/MM3 (ref 4.14–5.8)
SARS-COV-2 RNA RESP QL NAA+PROBE: NOT DETECTED
SODIUM SERPL-SCNC: 139 MMOL/L (ref 136–145)
WBC NRBC COR # BLD: 9.13 10*3/MM3 (ref 3.4–10.8)

## 2022-01-13 PROCEDURE — 96375 TX/PRO/DX INJ NEW DRUG ADDON: CPT

## 2022-01-13 PROCEDURE — 73140 X-RAY EXAM OF FINGER(S): CPT

## 2022-01-13 PROCEDURE — 80048 BASIC METABOLIC PNL TOTAL CA: CPT | Performed by: EMERGENCY MEDICINE

## 2022-01-13 PROCEDURE — 36415 COLL VENOUS BLD VENIPUNCTURE: CPT

## 2022-01-13 PROCEDURE — 83605 ASSAY OF LACTIC ACID: CPT | Performed by: EMERGENCY MEDICINE

## 2022-01-13 PROCEDURE — 99284 EMERGENCY DEPT VISIT MOD MDM: CPT

## 2022-01-13 PROCEDURE — 25010000002 HYDROMORPHONE 1 MG/ML SOLUTION: Performed by: EMERGENCY MEDICINE

## 2022-01-13 PROCEDURE — 85025 COMPLETE CBC W/AUTO DIFF WBC: CPT | Performed by: EMERGENCY MEDICINE

## 2022-01-13 PROCEDURE — C9803 HOPD COVID-19 SPEC COLLECT: HCPCS | Performed by: EMERGENCY MEDICINE

## 2022-01-13 PROCEDURE — 25010000002 CEFTRIAXONE PER 250 MG: Performed by: EMERGENCY MEDICINE

## 2022-01-13 PROCEDURE — U0003 INFECTIOUS AGENT DETECTION BY NUCLEIC ACID (DNA OR RNA); SEVERE ACUTE RESPIRATORY SYNDROME CORONAVIRUS 2 (SARS-COV-2) (CORONAVIRUS DISEASE [COVID-19]), AMPLIFIED PROBE TECHNIQUE, MAKING USE OF HIGH THROUGHPUT TECHNOLOGIES AS DESCRIBED BY CMS-2020-01-R: HCPCS | Performed by: EMERGENCY MEDICINE

## 2022-01-13 PROCEDURE — 96365 THER/PROPH/DIAG IV INF INIT: CPT

## 2022-01-13 PROCEDURE — 87040 BLOOD CULTURE FOR BACTERIA: CPT | Performed by: EMERGENCY MEDICINE

## 2022-01-13 RX ORDER — SODIUM CHLORIDE 0.9 % (FLUSH) 0.9 %
10 SYRINGE (ML) INJECTION AS NEEDED
Status: DISCONTINUED | OUTPATIENT
Start: 2022-01-13 | End: 2022-01-14 | Stop reason: HOSPADM

## 2022-01-13 RX ADMIN — HYDROMORPHONE HYDROCHLORIDE 1 MG: 1 INJECTION, SOLUTION INTRAMUSCULAR; INTRAVENOUS; SUBCUTANEOUS at 20:40

## 2022-01-13 RX ADMIN — CEFTRIAXONE 1 G: 1 INJECTION, POWDER, FOR SOLUTION INTRAMUSCULAR; INTRAVENOUS at 20:42

## 2022-01-14 NOTE — ED PROVIDER NOTES
EMERGENCY DEPARTMENT ENCOUNTER    Room Number:  15/15  Date of encounter:  1/13/2022  PCP: Provider, No Known  Historian: Patient      HPI:  Chief Complaint: Left finger swelling  A complete HPI/ROS/PMH/PSH/SH/FH are unobtainable due to: None    Context: Get Coronado is a 21 y.o. male who presents to the ED c/o left ring finger swelling.  He states that he started having some changes 2 weeks ago to his finger.  Approximately 2 days ago, he tried to cut his ring off.  However he developed significantly worsened swelling following then.  He is here today now with constant mild pain in his left finger that worsens significantly with any attempt to remove the ring.  He has had no fever.      PAST MEDICAL HISTORY  Active Ambulatory Problems     Diagnosis Date Noted   • Cellulitis of left lower extremity 06/22/2021   • History of substance abuse (HCC) 06/22/2021   • Leukocytosis 06/22/2021     Resolved Ambulatory Problems     Diagnosis Date Noted   • No Resolved Ambulatory Problems     No Additional Past Medical History         PAST SURGICAL HISTORY  History reviewed. No pertinent surgical history.      FAMILY HISTORY  History reviewed. No pertinent family history.      SOCIAL HISTORY  Social History     Socioeconomic History   • Marital status: Single   Tobacco Use   • Smoking status: Current Every Day Smoker   • Smokeless tobacco: Never Used   Substance and Sexual Activity   • Alcohol use: Not Currently   • Drug use: Yes     Types: Methamphetamines     Comment: daily   • Sexual activity: Defer         ALLERGIES  Patient has no known allergies.        REVIEW OF SYSTEMS  Review of Systems     All systems reviewed and negative except for those discussed in HPI.       PHYSICAL EXAM    I have reviewed the triage vital signs and nursing notes.    ED Triage Vitals [01/13/22 2013]   Temp Heart Rate Resp BP SpO2   99.6 °F (37.6 °C) (!) 132 18 143/77 99 %      Temp src Heart Rate Source Patient Position BP Location FiO2 (%)    Oral Monitor -- Right arm --       Physical Exam  GENERAL: not distressed  HENT: nares patent  EYES: no scleral icterus  CV: regular rhythm, tachycardic  RESPIRATORY: normal effort  MUSCULOSKELETAL: Left ring finger is markedly swollen, particularly distal to the patient's ring.  It is erythematous with serous drainage.  There is swelling that is more proximal to the ring but not as significant.  He has excellent capillary refill.  NEURO: alert, moves all extremities, follows commands  SKIN: warm, dry        LAB RESULTS  Recent Results (from the past 24 hour(s))   Basic Metabolic Panel    Collection Time: 01/13/22  8:35 PM    Specimen: Blood   Result Value Ref Range    Glucose 99 65 - 99 mg/dL    BUN 10 6 - 20 mg/dL    Creatinine 0.92 0.76 - 1.27 mg/dL    Sodium 139 136 - 145 mmol/L    Potassium 3.7 3.5 - 5.2 mmol/L    Chloride 104 98 - 107 mmol/L    CO2 25.5 22.0 - 29.0 mmol/L    Calcium 8.7 8.6 - 10.5 mg/dL    eGFR Non African Amer 104 >60 mL/min/1.73    BUN/Creatinine Ratio 10.9 7.0 - 25.0    Anion Gap 9.5 5.0 - 15.0 mmol/L   Lactic Acid, Plasma    Collection Time: 01/13/22  8:35 PM    Specimen: Blood   Result Value Ref Range    Lactate 1.7 0.5 - 2.0 mmol/L   CBC Auto Differential    Collection Time: 01/13/22  8:35 PM    Specimen: Blood   Result Value Ref Range    WBC 9.13 3.40 - 10.80 10*3/mm3    RBC 4.87 4.14 - 5.80 10*6/mm3    Hemoglobin 14.5 13.0 - 17.7 g/dL    Hematocrit 41.2 37.5 - 51.0 %    MCV 84.6 79.0 - 97.0 fL    MCH 29.8 26.6 - 33.0 pg    MCHC 35.2 31.5 - 35.7 g/dL    RDW 12.0 (L) 12.3 - 15.4 %    RDW-SD 36.5 (L) 37.0 - 54.0 fl    MPV 9.3 6.0 - 12.0 fL    Platelets 259 140 - 450 10*3/mm3    Neutrophil % 71.9 42.7 - 76.0 %    Lymphocyte % 13.5 (L) 19.6 - 45.3 %    Monocyte % 13.1 (H) 5.0 - 12.0 %    Eosinophil % 0.9 0.3 - 6.2 %    Basophil % 0.5 0.0 - 1.5 %    Immature Grans % 0.1 0.0 - 0.5 %    Neutrophils, Absolute 6.56 1.70 - 7.00 10*3/mm3    Lymphocytes, Absolute 1.23 0.70 - 3.10 10*3/mm3     Monocytes, Absolute 1.20 (H) 0.10 - 0.90 10*3/mm3    Eosinophils, Absolute 0.08 0.00 - 0.40 10*3/mm3    Basophils, Absolute 0.05 0.00 - 0.20 10*3/mm3    Immature Grans, Absolute 0.01 0.00 - 0.05 10*3/mm3    nRBC 0.0 0.0 - 0.2 /100 WBC       Ordered the above labs and independently reviewed the results.        RADIOLOGY  XR Finger 2+ View Left    Result Date: 1/13/2022  3 VIEWS LEFT RING FINGER  HISTORY: Swelling  COMPARISON: None available.  FINDINGS: No acute fracture or subluxation of the left ring finger is seen, although the patient does have a ring overlying the shaft of the proximal phalanx finger. There is no significant degenerative change. The patient has fusiform soft tissue swelling of the left ring finger, about the proximal interphalangeal joint. No aggressive osseous abnormalities are seen.      Fusiform soft tissue swelling involving the left ring finger. No aggressive osseous abnormalities are seen.  This report was finalized on 1/13/2022 9:46 PM by Dr. Marya Shay M.D.        I ordered the above noted radiological studies. Reviewed by me and discussed with radiologist.  See dictation for official radiology interpretation.      PROCEDURES    Procedures      MEDICATIONS GIVEN IN ER    Medications   sodium chloride 0.9 % flush 10 mL (has no administration in time range)   cefTRIAXone (ROCEPHIN) 1 g in sodium chloride 0.9 % 100 mL IVPB-VTB (0 g Intravenous Stopped 1/13/22 2112)   HYDROmorphone (DILAUDID) injection 1 mg (1 mg Intravenous Given 1/13/22 2040)         PROGRESS, DATA ANALYSIS, CONSULTS, AND MEDICAL DECISION MAKING    All labs have been independently reviewed by me.  All radiology studies have been reviewed by me and discussed with radiologist dictating the report.   EKG's independently viewed and interpreted by me.  Discussion below represents my analysis of pertinent findings related to patient's condition, differential diagnosis, treatment plan and final disposition.    Differential  diagnosis includes cellulitis, flexor tenosynovitis, compartment syndrome, inflammatory changes due to ring entrapment.    ED Course as of 01/13/22 2215   u Jan 13, 2022 2105 WBC: 9.13 [TD]   2155 X-ray left finger interpreted myself.  Soft tissue swelling to the left ring finger [TD]   2155 I discussed the case with SVEN Peoples for hand surgery at [facility].  I discussed the case with her.  She then spoke with her attending physician.  Dr. Paul Reyes has accepted the patient for transfer. [TD]      ED Course User Index  [TD] Balaji Hester II, MD     We did attempt cutting the ring in the ER.  However, patient did not tolerate this.  I suspect that he will need sedation to be able to tolerate the procedure as this will be a rather traumatic ring removal process.    Patient is being transferred to [facility] due to his prominent finger swelling.  He was administered IV antibiotics in the ER.    PPE: The patient wore a surgical mask throughout the entire patient encounter. I wore an N95.    AS OF 22:15 EST VITALS:    BP - 132/95  HR - 74  TEMP - 99.6 °F (37.6 °C) (Oral)  O2 SATS - 93%        DIAGNOSIS  Final diagnoses:   Tight ring on left ring finger   Cellulitis of finger of left hand         DISPOSITION  Transfer           Balaji Hester II, MD  01/13/22 2214

## 2022-01-14 NOTE — ED TRIAGE NOTES
Pt presents to ED with complaints of left ring finger swelling with ring embedded into red and swollen finger space.    .ppe3

## 2022-01-14 NOTE — CASE MANAGEMENT/SOCIAL WORK
[facility] notified that patient needs to be transported to [facility].

## 2022-01-18 LAB
BACTERIA SPEC AEROBE CULT: NORMAL
BACTERIA SPEC AEROBE CULT: NORMAL

## 2024-05-03 NOTE — PROGRESS NOTES
"[facility]  Clinical Pharmacy Department     Vancomycin and zosyn Pharmacokinetic Note    Get Coronado is a 21 y.o. male who is on day 1 of pharmacy to dose vancomycin for complicated skin and soft tissue infection, sepsis.    Target level: AUC24 400-600  Consulting Provider:  Dr. Montez  Current Vancomycin Dose:   TBD  Planned Duration of Therapy: 7 days      Allergies  Allergies as of 06/21/2021   • (No Known Allergies)       Microbiology:  Microbiology Results (last 10 days)     Procedure Component Value - Date/Time    COVID PRE-OP / PRE-PROCEDURE SCREENING ORDER (NO ISOLATION) - Swab, Nasopharynx [[id number]]  (Normal) Collected: 06/22/21 0230    Lab Status: Final result Specimen: Swab from Nasopharynx Updated: 06/22/21 1140    Narrative:      The following orders were created for panel order COVID PRE-OP / PRE-PROCEDURE SCREENING ORDER (NO ISOLATION) - Swab, Nasopharynx.  Procedure                               Abnormality         Status                     ---------                               -----------         ------                     COVID-19,APTIMA PANTHER,...[[id number]]  Normal              Final result                 Please view results for these tests on the individual orders.    COVID-19,APTIMA PANTHER,[facility] IN-HOUSE, NP/OP SWAB IN UTM/VTM/SALINE TRANSPORT MEDIA,24 HR TAT - Swab, Nasopharynx [[id number]]  (Normal) Collected: 06/22/21 0230    Lab Status: Final result Specimen: Swab from Nasopharynx Updated: 06/22/21 1140     COVID19 Not Detected    Narrative:      Fact sheet for providers: https://www.fda.gov/media/796589/download     Fact sheet for patients: https://www.fda.gov/media/269061/download    Test performed by RT PCR.          Vitals/Labs/I&O  190.5 cm (75\")  74.8 kg (165 lb)  Body mass index is 20.62 kg/m².   Temp:  [96.4 °F (35.8 °C)-98.7 °F (37.1 °C)] 96.6 °F (35.9 °C)  Heart Rate:  [] 52  Resp:  [14-18] 16  BP: ()/(49-72) 105/72    Results from last 7 days   Lab " Keep area clean and dry  Use soap and water and pat to dry  Apply antibiotic ointment sparingly and cover if still open  If scabbed over leave open to air  Contact clinic with signs of infection    "Units 06/22/21  0551 06/22/21  0225   WBC 10*3/mm3 11.38* 15.68*     Results from last 7 days   Lab Units 06/22/21 0225   LACTATE mmol/L 0.7      Results from last 7 days   Lab Units 06/22/21  0225   PROCALCITONIN ng/mL 0.11     Results from last 7 days   Lab Units 06/22/21 0225   CRP mg/dL 12.82*       Results from last 7 days   Lab Units 06/22/21  0225   SED RATE mm/hr 33*        Estimated Creatinine Clearance: 140.5 mL/min (by C-G formula based on SCr of 0.88 mg/dL).  Results from last 7 days   Lab Units 06/22/21  0551 06/22/21 0225   BUN mg/dL 11 11   CREATININE mg/dL 0.88 0.87     Intake & Output (last 3 days)       06/19 0701 - 06/20 0700 06/20 0701 - 06/21 0700 06/21 0701 - 06/22 0700 06/22 0701 - 06/23 0700    IV Piggyback   2500     Total Intake(mL/kg)   2500 (33.4)     Net   +2500             Urine Unmeasured Occurrence    11 x          Vancomycin Dosing and Level History:  Vancomycin 1500mg loading dose   6/22 0342    Assessment/Plan:    Assessment:  Bayesian analysis of the most recent level(s) using [employer] provides the following patient-specific pharmacokinetic parameters:   CL: 5.09 L/h   Vd: 62.4 L   T1/2: 8.78 h  If the predicted trough on this regimen is not within what was previously considered a \"target trough range\", the AUC24 (a stronger predictor of vancomycin efficacy) is predicted to achieve the accepted target of 400-600 mg*h/L. To best balance efficacy and toxicity, we will be targeting AUC24 in this patient rather than steady-state trough levels.     Initiating the regimen of 1000 mg (13.4mg/kg) IV every 8 hours gives a predicted steady-state trough of 18.7 mg/L and AUC24 of 584 mg*h/L based upon population pharmacokinetics and this patient's specific parameters.    Recommendations/Plan:  -Initiate vancomycin 1000 mg (13.4 mg/kg) IV every 8 hours   -Obtain vancomycin level on 6/23 @ 1730 prior to the 4 dose or sooner if acute changes   -Continue to monitor SCr  -Initiate zosyn 3.375g " q8h extended infusion    Thank you for involving pharmacy in this patient's care. Please contact pharmacy with any questions or concerns.                           Yvan Munoz [facility]  Clinical Pharmacist  06/22/21 16:25 EDT